# Patient Record
Sex: FEMALE | Race: WHITE | NOT HISPANIC OR LATINO | Employment: OTHER | URBAN - METROPOLITAN AREA
[De-identification: names, ages, dates, MRNs, and addresses within clinical notes are randomized per-mention and may not be internally consistent; named-entity substitution may affect disease eponyms.]

---

## 2022-05-27 NOTE — PROGRESS NOTES
Chief Complaint        Abdominal spasms    History of Present Illness      Sandy Boothe is a 62 y.o. female who presents to [facility] as a new patient with a history of abdominal spasms and personal history of colon polyps.  Patient reports March 2020 that she began feeling a lower abdominal spasm-like sensation that felt like her bowel was twisting.  She reports the pain is intermittent lasting anywhere from 1 to 4 hours when the pain arrives.  She reports bowel movements are normal occurring daily without melena or hematochezia.  She reports being placed on Protonix by her primary care provider which provided no relief.  She denies reflux or heartburn symptoms or any upper GI symptoms.  She denies any family history of colorectal cancer.  Patient reports her last colonoscopy was over 11 years ago and they did remove a couple of polyps.  Patient denies fever, nausea, vomiting, weight loss, night sweats, melena, hematochezia, hematemesis.    No colonoscopy or EGD on file for patient.     Results       Result Review :   The following data was reviewed by: DONITA Ortiz on 05/31/2022                        Past Medical History       History reviewed. No pertinent past medical history.    History reviewed. No pertinent surgical history.      Current Outpatient Medications:   •  methIMAzole (TAPAZOLE) 5 MG tablet, Take 5 mg by mouth 3 (Three) Times a Day., Disp: , Rfl:   •  pantoprazole (PROTONIX) 40 MG EC tablet, Take 40 mg by mouth As Needed., Disp: , Rfl:   •  dicyclomine (BENTYL) 20 MG tablet, Take 1 tablet by mouth Every 6 (Six) Hours., Disp: 90 tablet, Rfl: 3  •  Sodium Sulfate-Mag Sulfate-KCl (Sutab) 6723-549-642 MG tablet, Take 12 tablets by mouth Take As Directed., Disp: 24 tablet, Rfl: 0     No Known Allergies    History reviewed. No pertinent family history.     Social History     Social History Narrative   • Not on file       Objective       Objective     Vital  "Signs:   /74   Pulse 72   Ht 160 cm (63\")   Wt 75.6 kg (166 lb 9.6 oz)   SpO2 100%   BMI 29.51 kg/m²     Body mass index is 29.51 kg/m².    Physical Exam  Constitutional:       General: She is not in acute distress.     Appearance: Normal appearance. She is well-developed and normal weight.   Eyes:      Conjunctiva/sclera: Conjunctivae normal.      Pupils: Pupils are equal, round, and reactive to light.      Visual Fields: Right eye visual fields normal and left eye visual fields normal.   Cardiovascular:      Rate and Rhythm: Normal rate and regular rhythm.      Heart sounds: Normal heart sounds.   Pulmonary:      Effort: Pulmonary effort is normal. No retractions.      Breath sounds: Normal breath sounds and air entry.      Comments: Inspection of chest: normal appearance  Abdominal:      General: Bowel sounds are normal.      Palpations: Abdomen is soft.      Tenderness: There is no abdominal tenderness.      Comments: No appreciable hepatosplenomegaly   Musculoskeletal:      Cervical back: Neck supple.      Right lower leg: No edema.      Left lower leg: No edema.   Lymphadenopathy:      Cervical: No cervical adenopathy.   Skin:     Findings: No lesion.      Comments: Turgor normal   Neurological:      Mental Status: She is alert and oriented to person, place, and time.   Psychiatric:         Mood and Affect: Mood and affect normal.              Assessment & Plan          Assessment and Plan    Diagnoses and all orders for this visit:    1. Personal history of colonic polyps (Primary)    2. Abdominal spasms    3. Lower abdominal pain    Other orders  -     Sodium Sulfate-Mag Sulfate-KCl (Sutab) 3060-829-869 MG tablet; Take 12 tablets by mouth Take As Directed.  Dispense: 24 tablet; Refill: 0  -     dicyclomine (BENTYL) 20 MG tablet; Take 1 tablet by mouth Every 6 (Six) Hours.  Dispense: 90 tablet; Refill: 3      62-year-old female presenting the office today with a history of colon polyps and " abdominal spasms.  I have recommended that the patient undergo further evaluation with a colonoscopy.  I have discussed this procedure in detail with the patient.  I have discussed the risks, benefits and alternatives.  I have discussed the risk of anesthesia, bleeding and perforation.  Patient understands these risks, benefits and alternatives and wishes to proceed.  I will schedule her at her earliest convenience.  I have given the patient Bentyl 20 mg to use as needed for abdominal spasms.  Patient will follow-up in the office after endoscopy.  Patient agreeable to this plan will call with any questions or concerns.            Follow Up       Follow Up   Return for Follow up after endoscopy in office.  Patient was given instructions and counseling regarding her condition or for health maintenance advice. Please see specific information pulled into the AVS if appropriate.

## 2022-05-31 ENCOUNTER — OFFICE VISIT (OUTPATIENT)
Dept: GASTROENTEROLOGY | Facility: CLINIC | Age: 62
End: 2022-05-31

## 2022-05-31 ENCOUNTER — PREP FOR SURGERY (OUTPATIENT)
Dept: OTHER | Facility: HOSPITAL | Age: 62
End: 2022-05-31

## 2022-05-31 VITALS
HEIGHT: 63 IN | WEIGHT: 166.6 LBS | BODY MASS INDEX: 29.52 KG/M2 | DIASTOLIC BLOOD PRESSURE: 74 MMHG | HEART RATE: 72 BPM | SYSTOLIC BLOOD PRESSURE: 149 MMHG | OXYGEN SATURATION: 100 %

## 2022-05-31 DIAGNOSIS — R10.9 ABDOMINAL SPASMS: ICD-10-CM

## 2022-05-31 DIAGNOSIS — R10.30 LOWER ABDOMINAL PAIN: ICD-10-CM

## 2022-05-31 DIAGNOSIS — Z86.010 PERSONAL HISTORY OF COLONIC POLYPS: Primary | ICD-10-CM

## 2022-05-31 PROBLEM — R68.89 HEAT INTOLERANCE: Status: ACTIVE | Noted: 2021-12-13

## 2022-05-31 PROBLEM — Z86.0100 PERSONAL HISTORY OF COLONIC POLYPS: Status: ACTIVE | Noted: 2022-05-31

## 2022-05-31 PROBLEM — E05.90 HYPERTHYROIDISM: Status: ACTIVE | Noted: 2021-12-13

## 2022-05-31 PROBLEM — R63.5 ABNORMAL WEIGHT GAIN: Status: ACTIVE | Noted: 2021-12-13

## 2022-05-31 PROCEDURE — 99204 OFFICE O/P NEW MOD 45 MIN: CPT | Performed by: NURSE PRACTITIONER

## 2022-05-31 RX ORDER — DICYCLOMINE HCL 20 MG
20 TABLET ORAL EVERY 6 HOURS
Qty: 90 TABLET | Refills: 3 | Status: ON HOLD | OUTPATIENT
Start: 2022-05-31 | End: 2022-08-24

## 2022-05-31 RX ORDER — PANTOPRAZOLE SODIUM 40 MG/1
40 TABLET, DELAYED RELEASE ORAL AS NEEDED
Status: ON HOLD | COMMUNITY
End: 2022-08-24

## 2022-05-31 RX ORDER — SOD SULF/POT CHLORIDE/MAG SULF 1.479 G
12 TABLET ORAL TAKE AS DIRECTED
Qty: 24 TABLET | Refills: 0 | Status: ON HOLD | OUTPATIENT
Start: 2022-05-31 | End: 2022-08-24

## 2022-05-31 RX ORDER — METHIMAZOLE 5 MG/1
5 TABLET ORAL 3 TIMES DAILY
COMMUNITY
Start: 2022-03-15

## 2022-06-01 ENCOUNTER — TRANSCRIBE ORDERS (OUTPATIENT)
Dept: ADMINISTRATIVE | Facility: HOSPITAL | Age: 62
End: 2022-06-01

## 2022-06-01 DIAGNOSIS — Z12.31 VISIT FOR SCREENING MAMMOGRAM: Primary | ICD-10-CM

## 2022-06-08 ENCOUNTER — PATIENT ROUNDING (BHMG ONLY) (OUTPATIENT)
Dept: GASTROENTEROLOGY | Facility: CLINIC | Age: 62
End: 2022-06-08

## 2022-06-08 NOTE — PROGRESS NOTES
June 8, 2022    Hello, may I speak with Sandy Boothe?    My name is Juan Carlos      I am  with [facility]  [address] [place] [address] [phone].    Before we get started may I verify your date of birth? 1960    I am calling to officially welcome you to our practice and ask about your recent visit. Is this a good time to talk? No. Left pt VM.     Tell me about your visit with us. What things went well?         We're always looking for ways to make our patients' experiences even better. Do you have recommendations on ways we may improve?      Overall were you satisfied with your first visit to our practice?        I appreciate you taking the time to speak with me today. Is there anything else I can do for you?       Thank you, and have a great day.

## 2022-07-11 ENCOUNTER — HOSPITAL ENCOUNTER (OUTPATIENT)
Dept: MAMMOGRAPHY | Facility: HOSPITAL | Age: 62
Discharge: HOME | End: 2022-07-11
Admitting: INTERNAL MEDICINE

## 2022-07-11 DIAGNOSIS — Z12.31 VISIT FOR SCREENING MAMMOGRAM: ICD-10-CM

## 2022-07-11 PROCEDURE — 77067 SCR MAMMO BI INCL CAD: CPT

## 2022-07-11 PROCEDURE — 77063 BREAST TOMOSYNTHESIS BI: CPT

## 2022-08-23 NOTE — PAT
Attempted to contact patient regarding PAT.  No answer.  Left message with arrival time of 0830 on 8/24/22 for a colonoscopy with Dr. Rose.  Instructed to come to the [facility] entrance to check in. Reinforced NPO.    Netta Segal RN 10:51 EDT 8/23/2022

## 2022-08-24 ENCOUNTER — ANESTHESIA EVENT (OUTPATIENT)
Dept: GASTROENTEROLOGY | Facility: HOSPITAL | Age: 62
End: 2022-08-24

## 2022-08-24 ENCOUNTER — ANESTHESIA (OUTPATIENT)
Dept: GASTROENTEROLOGY | Facility: HOSPITAL | Age: 62
End: 2022-08-24

## 2022-08-24 ENCOUNTER — HOSPITAL ENCOUNTER (OUTPATIENT)
Facility: HOSPITAL | Age: 62
Setting detail: HOSPITAL OUTPATIENT SURGERY
Discharge: HOME | End: 2022-08-24
Attending: INTERNAL MEDICINE | Admitting: INTERNAL MEDICINE

## 2022-08-24 VITALS
SYSTOLIC BLOOD PRESSURE: 120 MMHG | OXYGEN SATURATION: 97 % | RESPIRATION RATE: 21 BRPM | TEMPERATURE: 96.9 F | DIASTOLIC BLOOD PRESSURE: 76 MMHG | BODY MASS INDEX: 29.06 KG/M2 | HEART RATE: 78 BPM | WEIGHT: 164.02 LBS

## 2022-08-24 DIAGNOSIS — R10.9 ABDOMINAL SPASMS: ICD-10-CM

## 2022-08-24 DIAGNOSIS — R10.30 LOWER ABDOMINAL PAIN: ICD-10-CM

## 2022-08-24 DIAGNOSIS — Z86.010 PERSONAL HISTORY OF COLONIC POLYPS: ICD-10-CM

## 2022-08-24 PROCEDURE — 88305 TISSUE EXAM BY PATHOLOGIST: CPT | Performed by: INTERNAL MEDICINE

## 2022-08-24 PROCEDURE — 45385 COLONOSCOPY W/LESION REMOVAL: CPT | Performed by: INTERNAL MEDICINE

## 2022-08-24 PROCEDURE — 25010000002 PROPOFOL 10 MG/ML EMULSION: Performed by: NURSE ANESTHETIST, CERTIFIED REGISTERED

## 2022-08-24 RX ORDER — PROPOFOL 10 MG/ML
VIAL (ML) INTRAVENOUS AS NEEDED
Status: DISCONTINUED | OUTPATIENT
Start: 2022-08-24 | End: 2022-08-24 | Stop reason: SURG

## 2022-08-24 RX ORDER — SODIUM CHLORIDE, SODIUM LACTATE, POTASSIUM CHLORIDE, CALCIUM CHLORIDE 600; 310; 30; 20 MG/100ML; MG/100ML; MG/100ML; MG/100ML
30 INJECTION, SOLUTION INTRAVENOUS CONTINUOUS
Status: DISCONTINUED | OUTPATIENT
Start: 2022-08-24 | End: 2022-08-24 | Stop reason: HOSPADM

## 2022-08-24 RX ORDER — LIDOCAINE HYDROCHLORIDE 20 MG/ML
INJECTION, SOLUTION EPIDURAL; INFILTRATION; INTRACAUDAL; PERINEURAL AS NEEDED
Status: DISCONTINUED | OUTPATIENT
Start: 2022-08-24 | End: 2022-08-24 | Stop reason: SURG

## 2022-08-24 RX ADMIN — LIDOCAINE HYDROCHLORIDE 80 MG: 20 INJECTION, SOLUTION EPIDURAL; INFILTRATION; INTRACAUDAL; PERINEURAL at 10:35

## 2022-08-24 RX ADMIN — SODIUM CHLORIDE, POTASSIUM CHLORIDE, SODIUM LACTATE AND CALCIUM CHLORIDE 30 ML/HR: 600; 310; 30; 20 INJECTION, SOLUTION INTRAVENOUS at 09:33

## 2022-08-24 RX ADMIN — PROPOFOL 100 MG: 10 INJECTION, EMULSION INTRAVENOUS at 10:35

## 2022-08-24 RX ADMIN — PROPOFOL 225 MCG/KG/MIN: 10 INJECTION, EMULSION INTRAVENOUS at 10:36

## 2022-08-24 NOTE — H&P
ScreeningPre Procedure History & Physical    Chief Complaint:   Screening     Subjective     HPI:   Screening     Past Medical History:   Past Medical History:   Diagnosis Date   • Thyroid disease        Past Surgical History:  Past Surgical History:   Procedure Laterality Date   • COLONOSCOPY         Family History:  History reviewed. No pertinent family history.    Social History:   reports that she has never smoked. She has never used smokeless tobacco. She reports previous alcohol use of about 2.0 standard drinks of alcohol per week. She reports that she does not use drugs.    Medications:   Medications Prior to Admission   Medication Sig Dispense Refill Last Dose   • methIMAzole (TAPAZOLE) 5 MG tablet Take 5 mg by mouth 3 (Three) Times a Day.   8/23/2022 at Unknown time       Allergies:  Patient has no known allergies.        Objective     Blood pressure 121/79, pulse 75, temperature 97.1 °F (36.2 °C), temperature source Temporal, resp. rate 16, weight 74.4 kg (164 lb 0.4 oz), SpO2 96 %.    Physical Exam   Constitutional: Pt is oriented to person, place, and time and well-developed, well-nourished, and in no distress.   Mouth/Throat: Oropharynx is clear and moist.   Neck: Normal range of motion.   Cardiovascular: Normal rate, regular rhythm and normal heart sounds.    Pulmonary/Chest: Effort normal and breath sounds normal.   Abdominal: Soft. Nontender  Skin: Skin is warm and dry.   Psychiatric: Mood, memory, affect and judgment normal.     Assessment & Plan     Diagnosis:  Screening colonoscopy  H/o colon polyps     Anticipated Surgical Procedure:  colonoscopy    The risks, benefits, and alternatives of this procedure have been discussed with the patient or the responsible party- the patient understands and agrees to proceed.

## 2022-08-24 NOTE — ANESTHESIA PREPROCEDURE EVALUATION
Anesthesia Evaluation     Patient summary reviewed and Nursing notes reviewed                Airway   Mallampati: I  TM distance: >3 FB  Neck ROM: full  No difficulty expected  Dental      Pulmonary - negative pulmonary ROS and normal exam    breath sounds clear to auscultation  Cardiovascular - negative cardio ROS and normal exam    Rhythm: regular  Rate: normal        Neuro/Psych- negative ROS  GI/Hepatic/Renal/Endo    (+)   thyroid problem     Musculoskeletal (-) negative ROS    Abdominal    Substance History - negative use     OB/GYN negative ob/gyn ROS         Other                        Anesthesia Plan    ASA 1     general     intravenous induction     Anesthetic plan, risks, benefits, and alternatives have been provided, discussed and informed consent has been obtained with: patient.        CODE STATUS:

## 2022-08-24 NOTE — ANESTHESIA POSTPROCEDURE EVALUATION
Patient: Sandy Boothe    Procedure Summary     Date: 08/24/22 Room / Location: [facility] ENDOSCOPY 2 / [facility]    Anesthesia Start: 1033 Anesthesia Stop: 1108    Procedure: COLONOSCOPY WITH POLYPECTOMY (N/A ) Diagnosis:       Personal history of colonic polyps      Abdominal spasms      Lower abdominal pain      (Personal history of colonic polyps [Z86.010])      (Abdominal spasms [R10.9])      (Lower abdominal pain [R10.30])    Surgeons: Hosea Rose MD Provider: Carl Mart MD    Anesthesia Type: general ASA Status: 1          Anesthesia Type: general    Vitals  Vitals Value Taken Time   /81 08/24/22 1116   Temp 36.1 °C (97 °F) 08/24/22 1105   Pulse 73 08/24/22 1118   Resp 20 08/24/22 1115   SpO2 97 % 08/24/22 1118   Vitals shown include unvalidated device data.        Post Anesthesia Care and Evaluation    Patient location during evaluation: bedside  Patient participation: complete - patient participated  Level of consciousness: awake  Pain management: adequate    Airway patency: patent  Anesthetic complications: No anesthetic complications  PONV Status: none  Cardiovascular status: acceptable  Respiratory status: acceptable  Hydration status: acceptable    Comments: An Anesthesiologist personally participated in the most demanding procedures (including induction and emergence if applicable) in the anesthesia plan, monitored the course of anesthesia administration at frequent intervals and remained physically present and available for immediate diagnosis and treatment of emergencies.

## 2022-08-25 LAB
CYTO UR: NORMAL
LAB AP CASE REPORT: NORMAL
LAB AP CLINICAL INFORMATION: NORMAL
PATH REPORT.FINAL DX SPEC: NORMAL
PATH REPORT.GROSS SPEC: NORMAL

## 2023-01-18 ENCOUNTER — OFFICE VISIT (OUTPATIENT)
Dept: INTERNAL MEDICINE | Facility: CLINIC | Age: 63
End: 2023-01-18
Payer: COMMERCIAL

## 2023-01-18 VITALS
WEIGHT: 172.8 LBS | DIASTOLIC BLOOD PRESSURE: 88 MMHG | OXYGEN SATURATION: 99 % | BODY MASS INDEX: 30.62 KG/M2 | HEART RATE: 83 BPM | RESPIRATION RATE: 18 BRPM | SYSTOLIC BLOOD PRESSURE: 126 MMHG | HEIGHT: 63 IN | TEMPERATURE: 97.9 F

## 2023-01-18 DIAGNOSIS — Z71.85 VACCINE COUNSELING: ICD-10-CM

## 2023-01-18 DIAGNOSIS — D36.9 TUBULAR ADENOMA: ICD-10-CM

## 2023-01-18 DIAGNOSIS — Z00.00 ANNUAL PHYSICAL EXAM: ICD-10-CM

## 2023-01-18 DIAGNOSIS — Z13.31 NEGATIVE DEPRESSION SCREENING: ICD-10-CM

## 2023-01-18 DIAGNOSIS — Z13.820 SCREENING FOR OSTEOPOROSIS: ICD-10-CM

## 2023-01-18 DIAGNOSIS — Z11.59 ENCOUNTER FOR HEPATITIS C SCREENING TEST FOR LOW RISK PATIENT: ICD-10-CM

## 2023-01-18 DIAGNOSIS — R23.2 HOT FLASHES: ICD-10-CM

## 2023-01-18 DIAGNOSIS — Z00.00 ENCOUNTER FOR MEDICAL EXAMINATION TO ESTABLISH CARE: Primary | ICD-10-CM

## 2023-01-18 DIAGNOSIS — E05.90 HYPERTHYROIDISM: ICD-10-CM

## 2023-01-18 DIAGNOSIS — Z78.0 POST-MENOPAUSAL: ICD-10-CM

## 2023-01-18 PROBLEM — K30 FUNCTIONAL DYSPEPSIA: Status: ACTIVE | Noted: 2023-01-18

## 2023-01-18 PROBLEM — E66.3 OVERWEIGHT WITH BODY MASS INDEX (BMI) 25.0-29.9: Status: ACTIVE | Noted: 2023-01-18

## 2023-01-18 PROCEDURE — 99386 PREV VISIT NEW AGE 40-64: CPT | Performed by: STUDENT IN AN ORGANIZED HEALTH CARE EDUCATION/TRAINING PROGRAM

## 2023-01-18 NOTE — PROGRESS NOTES
"Chief Complaint  Establish Care    Subjective            Sandy Boothe presents to [facility]  History of Present Illness     Previous PCP: Dr. Mcdonald   Last seen by them: last seen in .   Last pap: unsure, of last pap exam.   Last mammogram:  2022 and wnl. Older sister (prosper left mastectomy, in her 50's) and maternal grandmother w/ breast cancer. Pt states she has a hx of abnormal mammogram due to dense breast.   Last dexa scan: \"it has been a while\"   Colonoscopy: last was in 2022, 5 yr recall  Smoking hx: socially in her teenage years.     She moved to [place] in 2021 from [place].     Hyperthyroidism:  Chronic as of past 3-4 years.   States her last thyroid imaging studies were done in 2019 at time of diagnosis. She is unsure if she had any thyroid nodules.    States it was diagnosed when she started having worsening hot flashes.   No one else in the family w/ thyroid disease.     Folllowing w/ Dr. Massey, endocrinology.       Hx of smaller right kidney:   Right kidney small than left noted on US of her abdomen.     Tubular adenoma:   Noted on recent colonoscopy, 4mm sessile polyp from ascending polyp.   5 yr recall is recommended.   No fhx of colon cancer.     Gyn/ob hx:   LMP was around 2000, in her 40's.   , vaginal, 6 wk premature.     States Corry Montgomery, her roommate,  is her support system.   States low mood due to current conflict w/ her roommate.   She reads, quilt and does puzzle in the colder months.   States she spends a lot of time of outside.     Vaccine counseling:   Unsure of timing of last tetanus vaccine     Past Medical History:   Diagnosis Date   • Thyroid disease        Allergies:   No Known Allergies       Past Surgical History:   Procedure Laterality Date   • COLONOSCOPY     • COLONOSCOPY N/A 2022    Procedure: COLONOSCOPY WITH POLYPECTOMY;  Surgeon: Hosea Rose MD;  Location: [facility] ENDOSCOPY;  " "Service: Gastroenterology;  Laterality: N/A;  COLON POLYP, DIVERTICULOSIS          Social History     Socioeconomic History   • Marital status:    Tobacco Use   • Smoking status: Never   • Smokeless tobacco: Never   Vaping Use   • Vaping Use: Never used   Substance and Sexual Activity   • Alcohol use: Not Currently     Alcohol/week: 2.0 standard drinks     Types: 2 Shots of liquor per week     Comment: Social   • Drug use: Never   • Sexual activity: Defer         Family History   Problem Relation Age of Onset   • Heart attack Father    • Pancreatic cancer Father     Mother w/ Parkison' disease.    Sister dc in her 60's due to complications from neck surgery (paralyzed from neck down, did not survived long after that).       Health Maintenance Due   Topic Date Due   • TDAP/TD VACCINES (1 - Tdap) Never done   • HEPATITIS C SCREENING  Never done   • ANNUAL PHYSICAL  Never done   • PAP SMEAR  Never done            Current Outpatient Medications:   •  methIMAzole (TAPAZOLE) 5 MG tablet, Take 5 mg by mouth 3 (Three) Times a Day., Disp: , Rfl:       Immunization History   Administered Date(s) Administered   • COVID-19 (PFIZER) BIVALENT BOOSTER 12+YRS 10/14/2022   • COVID-19 (PFIZER) PURPLE CAP 04/08/2021, 04/29/2021, 11/24/2021, 10/14/2022   • Fluzone Quad >6mos (Multi-dose) 10/27/2021   • Influenza Quad Vaccine (Inpatient) 10/14/2022   • Influenza, Unspecified 10/14/2022   • Shingrix 10/27/2021         Review of Systems   Per hpi     Objective       Vitals:    01/18/23 1457   BP: 126/88   BP Location: Left arm   Patient Position: Sitting   Cuff Size: Adult   Pulse: 83   Resp: 18   Temp: 97.9 °F (36.6 °C)   SpO2: 99%   Weight: 78.4 kg (172 lb 12.8 oz)   Height: 160 cm (62.99\")     Body mass index is 30.62 kg/m².      Physical Exam  Vitals reviewed.   Constitutional:       Appearance: Normal appearance.   HENT:      Head: Normocephalic and atraumatic.      Nose: Nose normal.   Eyes:      Extraocular Movements: " Extraocular movements intact.      Conjunctiva/sclera: Conjunctivae normal.   Cardiovascular:      Rate and Rhythm: Normal rate and regular rhythm.      Pulses: Normal pulses.      Heart sounds: Normal heart sounds.   Pulmonary:      Effort: Pulmonary effort is normal. No respiratory distress.      Breath sounds: Normal breath sounds.   Abdominal:      General: Bowel sounds are normal. There is no distension.      Palpations: Abdomen is soft.      Tenderness: There is no abdominal tenderness.   Musculoskeletal:         General: Normal range of motion.   Skin:     General: Skin is warm and dry.   Neurological:      General: No focal deficit present.      Mental Status: She is alert and oriented to person, place, and time.      Cranial Nerves: No cranial nerve deficit.   Psychiatric:         Mood and Affect: Mood normal.         Behavior: Behavior normal.         Thought Content: Thought content normal.             Result Review :                           Assessment and Plan      Diagnoses and all orders for this visit:    1. Encounter for medical examination to establish care (Primary)  Comments:  Acute and chronic needs addressed below.   Orders:  -     CBC & Differential  -     Comprehensive Metabolic Panel  -     Hemoglobin A1c  -     Lipid Panel  -     TSH  -     T4, Free  -     Vitamin D 25 hydroxy    2. Hyperthyroidism  Comments:  Chronic, due for labs. Following w/ endocrinology, Dr. Massey.     3. Hot flashes  Comments:  Chronic, stable.     4. Encounter for hepatitis C screening test for low risk patient  Comments:  Due for screening per current guidelines.  Orders:  -     Hepatitis C Antibody    5. Vaccine counseling  Comments:  Pt will work on finding date of last tetanus vaccine. She is otherwise utd.     6. Post-menopausal  Comments:  Due for screening for osteoporosis.   Orders:  -     DEXA Bone Density Axial    7. Screening for osteoporosis  Comments:  Due for screening for osteoporosis.   Orders:  -      DEXA Bone Density Axial    8. Annual physical exam  Comments:  Acute and chronic needs addressed.     9. Negative depression screening  Comments:  repeat screen prn     10. Tubular adenoma  Comments:  noted on colonoscopy from 08/2022 w/ 4mm polyp w/ 5 yr recall.       Preventive counseling about the importance of daily exercise, healthy eating and good sleep hygiene for cardiovascular and mental health discussed.         Follow Up     Return in about 4 months (around 5/18/2023) for pap exam .    Patient was given instructions and counseling regarding her condition or for health maintenance advice. Please see specific information pulled into the AVS if appropriate.     Candelaria Owens MD   Internal Medicine-Pediatrics

## 2023-01-22 PROBLEM — D36.9 TUBULAR ADENOMA: Status: ACTIVE | Noted: 2023-01-22

## 2023-01-24 ENCOUNTER — PATIENT ROUNDING (BHMG ONLY) (OUTPATIENT)
Dept: INTERNAL MEDICINE | Facility: CLINIC | Age: 63
End: 2023-01-24
Payer: COMMERCIAL

## 2023-02-02 NOTE — PROGRESS NOTES
A My-Chart message has been sent to the patient for PATIENT [facility] with [facility]

## 2023-02-06 ENCOUNTER — HOSPITAL ENCOUNTER (OUTPATIENT)
Dept: BONE DENSITY | Facility: HOSPITAL | Age: 63
Discharge: HOME | End: 2023-02-06
Admitting: STUDENT IN AN ORGANIZED HEALTH CARE EDUCATION/TRAINING PROGRAM
Payer: COMMERCIAL

## 2023-02-06 PROCEDURE — 77080 DXA BONE DENSITY AXIAL: CPT

## 2023-04-27 ENCOUNTER — OFFICE VISIT (OUTPATIENT)
Dept: INTERNAL MEDICINE | Facility: CLINIC | Age: 63
End: 2023-04-27
Payer: COMMERCIAL

## 2023-04-27 VITALS
HEIGHT: 63 IN | BODY MASS INDEX: 30.33 KG/M2 | TEMPERATURE: 98.1 F | RESPIRATION RATE: 18 BRPM | DIASTOLIC BLOOD PRESSURE: 92 MMHG | WEIGHT: 171.2 LBS | HEART RATE: 78 BPM | OXYGEN SATURATION: 100 % | SYSTOLIC BLOOD PRESSURE: 136 MMHG

## 2023-04-27 DIAGNOSIS — R09.89 GLOBUS SENSATION: Primary | ICD-10-CM

## 2023-04-27 DIAGNOSIS — M79.671 HEEL PAIN, CHRONIC, RIGHT: ICD-10-CM

## 2023-04-27 DIAGNOSIS — G89.29 HEEL PAIN, CHRONIC, RIGHT: ICD-10-CM

## 2023-04-27 DIAGNOSIS — R09.82 PND (POST-NASAL DRIP): ICD-10-CM

## 2023-04-27 PROCEDURE — 99214 OFFICE O/P EST MOD 30 MIN: CPT | Performed by: STUDENT IN AN ORGANIZED HEALTH CARE EDUCATION/TRAINING PROGRAM

## 2023-04-27 RX ORDER — FLUTICASONE PROPIONATE 50 MCG
2 SPRAY, SUSPENSION (ML) NASAL DAILY
Qty: 16 G | Refills: 1 | Status: SHIPPED | OUTPATIENT
Start: 2023-04-27

## 2023-04-27 RX ORDER — OMEPRAZOLE 20 MG/1
20 CAPSULE, DELAYED RELEASE ORAL DAILY
COMMUNITY
End: 2023-04-27 | Stop reason: SDUPTHER

## 2023-04-27 RX ORDER — CETIRIZINE HYDROCHLORIDE 10 MG/1
10 TABLET ORAL DAILY
Qty: 30 TABLET | Refills: 1 | Status: SHIPPED | OUTPATIENT
Start: 2023-04-27 | End: 2023-05-27

## 2023-04-27 RX ORDER — OMEPRAZOLE 20 MG/1
20 CAPSULE, DELAYED RELEASE ORAL DAILY
Qty: 30 CAPSULE | Refills: 1 | Status: SHIPPED | OUTPATIENT
Start: 2023-04-27 | End: 2023-05-27

## 2023-04-27 NOTE — PROGRESS NOTES
Chief Complaint  Heartburn (Is on omeprazole. Sometimes does have the lump in throat when laying down./), drainage (Constantly in the back of throat and coughing, been this way for years. Causes her to clear her throat all the time), labs (Chloride level was high and was wondering about that.), and Foot Injury (When she doesn't wear shoes feels like she has a stone bruise but doesn't hurt when touching.)    Subjective            Sandy Boothe presents to [facility]  History of Present Illness    GERD:  Chronic since 2021.   Endorses sensation of lump in her throat sometimes when she lays on her right side in bed, this improves if she lays on her back. Endorses burning sensation in her throat last week, which occurred just once.   Last meal is about 7pm, bedtime is 9:30pm.   Has Prilosec from previous provider which uses 1-2x per week.   Has never had EGD.     Posterior nasal drainage:  Chronic for many years. Has cough as well which started this winter, and feels that she always has to clear her throat.  Has not tried any otc agents.   Seen by ENT in past w/o any known dx.   Has hx of allergies.     Right heel concern:  Feels like a bruise over lateral aspect of her heel, rated 2/10. Going on for past 1 year.   States symptoms more prominent when she is barefoot, and tends to limp when she   No injury.   Area is not tender to the touch.         Past Medical History:   Diagnosis Date   • Thyroid disease        Allergies:   No Known Allergies       Past Surgical History:   Procedure Laterality Date   • COLONOSCOPY     • COLONOSCOPY N/A 8/24/2022    Procedure: COLONOSCOPY WITH POLYPECTOMY;  Surgeon: Hosea Rose MD;  Location: [facility] ENDOSCOPY;  Service: Gastroenterology;  Laterality: N/A;  COLON POLYP, DIVERTICULOSIS          Social History     Socioeconomic History   • Marital status:    Tobacco Use   • Smoking status: Never   • Smokeless  "tobacco: Never   Vaping Use   • Vaping Use: Never used   Substance and Sexual Activity   • Alcohol use: Not Currently     Alcohol/week: 2.0 standard drinks     Types: 2 Shots of liquor per week     Comment: Social   • Drug use: Never   • Sexual activity: Defer         Family History   Problem Relation Age of Onset   • Heart attack Father    • Pancreatic cancer Father           Health Maintenance Due   Topic Date Due   • TDAP/TD VACCINES (1 - Tdap) Never done   • HEPATITIS C SCREENING  Never done   • PAP SMEAR  Never done   • COVID-19 Vaccine (5 - Booster for Pfizer series) 12/09/2022            Current Outpatient Medications:   •  methIMAzole (TAPAZOLE) 5 MG tablet, Take 1 tablet by mouth 3 (Three) Times a Day., Disp: , Rfl:   •  omeprazole (priLOSEC) 20 MG capsule, Take 1 capsule by mouth Daily for 30 days. As needed, Disp: 30 capsule, Rfl: 1  •  cetirizine (zyrTEC) 10 MG tablet, Take 1 tablet by mouth Daily for 30 days., Disp: 30 tablet, Rfl: 1  •  fluticasone (FLONASE) 50 MCG/ACT nasal spray, 2 sprays into the nostril(s) as directed by provider Daily., Disp: 16 g, Rfl: 1      Immunization History   Administered Date(s) Administered   • COVID-19 (PFIZER) BIVALENT 12+YRS 10/14/2022   • COVID-19 (PFIZER) Purple Cap Monovalent 04/08/2021, 04/29/2021, 11/24/2021, 10/14/2022   • Fluzone Quad >6mos (Multi-dose) 10/27/2021   • Influenza Injectable Mdck Pf Quad 10/14/2022   • Influenza Quad Vaccine (Inpatient) 10/14/2022   • Influenza, Unspecified 10/14/2022   • Shingrix 10/27/2021         Review of Systems       Objective       Vitals:    04/27/23 0911   BP: 136/92   BP Location: Left arm   Patient Position: Sitting   Cuff Size: Adult   Pulse: 78   Resp: 18   Temp: 98.1 °F (36.7 °C)   SpO2: 100%   Weight: 77.7 kg (171 lb 3.2 oz)   Height: 160 cm (62.99\")     Body mass index is 30.33 kg/m².      Physical Exam  Vitals reviewed.   Constitutional:       Appearance: Normal appearance.   HENT:      Head: Normocephalic and " atraumatic.      Nose: Nose normal.   Eyes:      Extraocular Movements: Extraocular movements intact.      Conjunctiva/sclera: Conjunctivae normal.   Cardiovascular:      Rate and Rhythm: Normal rate and regular rhythm.      Pulses: Normal pulses.      Heart sounds: Normal heart sounds.   Pulmonary:      Effort: Pulmonary effort is normal. No respiratory distress.      Breath sounds: Normal breath sounds.   Musculoskeletal:         General: No swelling, tenderness, deformity or signs of injury. Normal range of motion.      Right lower leg: No edema.      Left lower leg: No edema.   Skin:     General: Skin is warm and dry.   Neurological:      General: No focal deficit present.      Mental Status: She is alert and oriented to person, place, and time.      Cranial Nerves: No cranial nerve deficit.   Psychiatric:         Mood and Affect: Mood normal.         Behavior: Behavior normal.         Thought Content: Thought content normal.             Result Review :                           Assessment and Plan      Diagnoses and all orders for this visit:    1. Globus sensation (Primary)  Comments:  Chronic, in pt w/ chronic GERD. On prilosec however using sparingly. Recommend pt starts taking prilosec daily instead of prn for next 6-12wks w/ plan to refer to GI if no improvement at f/u.   Orders:  -     omeprazole (priLOSEC) 20 MG capsule; Take 1 capsule by mouth Daily for 30 days. As needed  Dispense: 30 capsule; Refill: 1    2. PND (post-nasal drip)  Comments:  Chronic, however has never been on rx for allergies. New prescriptions sent  in.   Orders:  -     cetirizine (zyrTEC) 10 MG tablet; Take 1 tablet by mouth Daily for 30 days.  Dispense: 30 tablet; Refill: 1  -     fluticasone (FLONASE) 50 MCG/ACT nasal spray; 2 sprays into the nostril(s) as directed by provider Daily.  Dispense: 16 g; Refill: 1    3. Heel pain, chronic, right  Comments:  Chronic with unremarkable exam. Recommend supportive care w/ stretching  exercises. Discussed referral to podiatry if symptoms worsen.           Follow Up     Return in about 6 weeks (around 6/8/2023) for PND, cough, globus sensation .    Patient was given instructions and counseling regarding her condition or for health maintenance advice. Please see specific information pulled into the AVS if appropriate.     Candelaria Owens MD   Internal Medicine-Pediatrics

## 2023-06-14 ENCOUNTER — TELEPHONE (OUTPATIENT)
Dept: INTERNAL MEDICINE | Facility: CLINIC | Age: 63
End: 2023-06-14

## 2023-06-14 ENCOUNTER — OFFICE VISIT (OUTPATIENT)
Dept: INTERNAL MEDICINE | Facility: CLINIC | Age: 63
End: 2023-06-14
Payer: COMMERCIAL

## 2023-06-14 VITALS
WEIGHT: 176.4 LBS | BODY MASS INDEX: 31.25 KG/M2 | DIASTOLIC BLOOD PRESSURE: 70 MMHG | SYSTOLIC BLOOD PRESSURE: 122 MMHG | RESPIRATION RATE: 19 BRPM | HEART RATE: 87 BPM | OXYGEN SATURATION: 97 % | HEIGHT: 63 IN | TEMPERATURE: 97.8 F

## 2023-06-14 DIAGNOSIS — R09.89 GLOBUS SENSATION: Primary | ICD-10-CM

## 2023-06-14 DIAGNOSIS — R61 SWEATING INCREASE: ICD-10-CM

## 2023-06-14 DIAGNOSIS — S40.262A TICK BITE OF LEFT SHOULDER, INITIAL ENCOUNTER: ICD-10-CM

## 2023-06-14 DIAGNOSIS — T78.40XD ALLERGY, SUBSEQUENT ENCOUNTER: ICD-10-CM
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
## 2023-06-14 RX ORDER — OMEPRAZOLE 20 MG/1
20 CAPSULE, DELAYED RELEASE ORAL DAILY
COMMUNITY
End: 2023-06-14 | Stop reason: SDUPTHER

## 2023-06-14 RX ORDER — OMEPRAZOLE 20 MG/1
20 CAPSULE, DELAYED RELEASE ORAL DAILY
Qty: 90 CAPSULE | Refills: 1 | Status: SHIPPED | OUTPATIENT
Start: 2023-06-14 | End: 2023-09-12

## 2023-06-14 RX ORDER — DOXYCYCLINE HYCLATE 200 MG/1
150 TABLET, DELAYED RELEASE ORAL ONCE
Qty: 1 TABLET | Refills: 0 | Status: SHIPPED | OUTPATIENT
Start: 2023-06-14 | End: 2023-06-14

## 2023-06-14 RX ORDER — MONTELUKAST SODIUM 10 MG/1
10 TABLET ORAL NIGHTLY
Qty: 30 TABLET | Refills: 2 | Status: SHIPPED | OUTPATIENT
Start: 2023-06-14 | End: 2023-07-14

## 2023-06-14 NOTE — TELEPHONE ENCOUNTER
Pharmacy Name: [id number] - [place], [place] - [address]      Pharmacy representative name: MARIAJOSE    Pharmacy representative phone number: [phone]     What medication are you calling in regards to: DOXYCYCLINE 200 MG DELAYED RELEASE    What question does the pharmacy have: MARIAJOSE STATED THAT THEY ARE NEEDING CLARIFICATION ON DOSAGE AS IT INSTRUCTS 150 MG PER 1 DOSE    Who is the provider that prescribed the medication: Candelaria Owens MD

## 2023-06-14 NOTE — PROGRESS NOTES
"Chief Complaint  Globus sensation (6 week follow up), Cough, post nasal drip, and Excessive Sweating (Feels like she sweats too much.)    Subjective            Sandy Boothe presents to [facility]  History of Present Illness    Globus sensation:   Did take the prilosec daily as suggested and states she is no longer having any sensation of food getting stuck in her throat. She has chronic reflux, and has never had EGD.     Sweating:  Chronic, for >5 years.   Wonders if it has to do with her weight.   States she sweats even with doing the laundry at home and this is worse when she is outside.   She does have hot flashes and does the \"covers on and covers off\" at night.     Allergies:   Persisting.   Cough, post nasal drip remains the same.   Did not notice any improvement with zyrtec.    Reports recent tick bite x2. One tick she removed a couple days ago was very, engorged, she is unsure how long it was on her body prior to removal. Noted a new tick, thinner over left anterior thigh which she removed yesterday.      Past Medical History:   Diagnosis Date    Thyroid disease        Allergies:   No Known Allergies       Past Surgical History:   Procedure Laterality Date    COLONOSCOPY      COLONOSCOPY N/A 8/24/2022    Procedure: COLONOSCOPY WITH POLYPECTOMY;  Surgeon: Hosea Rose MD;  Location: [facility] ENDOSCOPY;  Service: Gastroenterology;  Laterality: N/A;  COLON POLYP, DIVERTICULOSIS          Social History     Socioeconomic History    Marital status:    Tobacco Use    Smoking status: Never    Smokeless tobacco: Never   Vaping Use    Vaping Use: Never used   Substance and Sexual Activity    Alcohol use: Not Currently     Alcohol/week: 2.0 standard drinks     Types: 2 Shots of liquor per week     Comment: Social    Drug use: Never    Sexual activity: Defer         Family History   Problem Relation Age of Onset    Heart attack Father     Pancreatic " "cancer Father           Health Maintenance Due   Topic Date Due    TDAP/TD VACCINES (1 - Tdap) Never done    HEPATITIS C SCREENING  Never done    PAP SMEAR  Never done    COVID-19 Vaccine (5 - Pfizer series) 12/09/2022            Current Outpatient Medications:     fluticasone (FLONASE) 50 MCG/ACT nasal spray, 2 sprays into the nostril(s) as directed by provider Daily., Disp: 16 g, Rfl: 1    methIMAzole (TAPAZOLE) 5 MG tablet, Take 1 tablet by mouth 3 (Three) Times a Day., Disp: , Rfl:     omeprazole (priLOSEC) 20 MG capsule, Take 1 capsule by mouth Daily for 90 days., Disp: 90 capsule, Rfl: 1    cetirizine (zyrTEC) 10 MG tablet, Take 1 tablet by mouth Daily for 30 days. (Patient not taking: Reported on 6/14/2023), Disp: 30 tablet, Rfl: 1    doxycycline 200 MG tablet delayed-release enteric coated tablet, Take 150 mg by mouth 1 (One) Time for 1 dose., Disp: 1 tablet, Rfl: 0    montelukast (Singulair) 10 MG tablet, Take 1 tablet by mouth Every Night for 30 days., Disp: 30 tablet, Rfl: 2      Immunization History   Administered Date(s) Administered    COVID-19 (PFIZER) BIVALENT 12+YRS 10/14/2022    COVID-19 (PFIZER) Purple Cap Monovalent 04/08/2021, 04/29/2021, 11/24/2021, 10/14/2022    Fluzone Quad >6mos (Multi-dose) 10/27/2021    Influenza Injectable Mdck Pf Quad 10/14/2022    Influenza Quad Vaccine (Inpatient) 10/14/2022    Influenza, Unspecified 10/14/2022    Shingrix 10/27/2021         Review of Systems       Objective       Vitals:    06/14/23 1032   BP: 122/70   BP Location: Left arm   Patient Position: Sitting   Cuff Size: Adult   Pulse: 87   Resp: 19   Temp: 97.8 °F (36.6 °C)   SpO2: 97%   Weight: 80 kg (176 lb 6.4 oz)   Height: 160 cm (62.99\")     Body mass index is 31.26 kg/m².      Physical Exam  Vitals reviewed.   Constitutional:       Appearance: Normal appearance.   HENT:      Head: Normocephalic and atraumatic.      Nose: Nose normal.   Eyes:      Extraocular Movements: Extraocular movements intact.    "   Conjunctiva/sclera: Conjunctivae normal.   Pulmonary:      Effort: Pulmonary effort is normal. No respiratory distress.   Musculoskeletal:         General: Normal range of motion.   Skin:     General: Skin is warm and dry.      Comments: Erythema noted over anterior aspect of left hip from recent tick bite.        Neurological:      General: No focal deficit present.      Mental Status: She is alert and oriented to person, place, and time.      Cranial Nerves: No cranial nerve deficit.   Psychiatric:         Mood and Affect: Mood normal.         Behavior: Behavior normal.         Thought Content: Thought content normal.           Result Review :                           Assessment and Plan      Diagnoses and all orders for this visit:    1. Globus sensation (Primary)  Comments:  Resolved w/ initiation of omeprazole which will be continued for 3-6mos. Discussed potential need for EGD if symptoms resume after planned discontinuation of PPI.  Orders:  -     omeprazole (priLOSEC) 20 MG capsule; Take 1 capsule by mouth Daily for 90 days.  Dispense: 90 capsule; Refill: 1    2. Sweating increase  Comments:  Chronic and active. Most likely dut to patient being post-menopausal. recent labs (cbc, thyroids) were normal.     3. Allergy, subsequent encounter  Comments:  Chronic, remains active. No improvement w/ zyrtec. Starting singulair.  Orders:  -     montelukast (Singulair) 10 MG tablet; Take 1 tablet by mouth Every Night for 30 days.  Dispense: 30 tablet; Refill: 2    4. Tick bite of left shoulder, initial encounter  Comments:  Recent tick bite, unsure of how long tick was present. Doxycline sent in.  Orders:  -     doxycycline 200 MG tablet delayed-release enteric coated tablet; Take 150 mg by mouth 1 (One) Time for 1 dose.  Dispense: 1 tablet; Refill: 0        Follow Up     Return in about 3 months (around 9/14/2023) for sweating, allergies .    Patient was given instructions and counseling regarding her condition or  for health maintenance advice. Please see specific information pulled into the AVS if appropriate.     Candelaria Owens MD   Internal Medicine-Pediatrics   Answers submitted by the patient for this visit:  Other (Submitted on 6/8/2023)  Please describe your symptoms.: Excess sweating and to check up on allergy med's  Have you had these symptoms before?: Yes  How long have you been having these symptoms?: 1-4 days  Primary Reason for Visit (Submitted on 6/8/2023)  What is the primary reason for your visit?: Other

## 2023-09-15 ENCOUNTER — OFFICE VISIT (OUTPATIENT)
Dept: INTERNAL MEDICINE | Facility: CLINIC | Age: 63
End: 2023-09-15
Payer: COMMERCIAL

## 2023-09-15 VITALS
SYSTOLIC BLOOD PRESSURE: 130 MMHG | WEIGHT: 172.38 LBS | HEIGHT: 63 IN | OXYGEN SATURATION: 99 % | DIASTOLIC BLOOD PRESSURE: 86 MMHG | BODY MASS INDEX: 30.54 KG/M2 | TEMPERATURE: 97.8 F | HEART RATE: 76 BPM

## 2023-09-15 DIAGNOSIS — Z23 INFLUENZA VACCINE NEEDED: ICD-10-CM

## 2023-09-15 DIAGNOSIS — Z12.4 CERVICAL CANCER SCREENING: ICD-10-CM

## 2023-09-15 DIAGNOSIS — L98.9 SKIN LESION: Primary | ICD-10-CM

## 2023-09-15 DIAGNOSIS — R09.82 PND (POST-NASAL DRIP): ICD-10-CM

## 2023-09-15 DIAGNOSIS — R05.3 CHRONIC COUGH: ICD-10-CM

## 2023-09-15 LAB
25(OH)D3 SERPL-MCNC: 38.6 NG/ML (ref 30–100)
ALBUMIN SERPL-MCNC: 4.3 G/DL (ref 3.5–5.2)
ALBUMIN/GLOB SERPL: 1.7 G/DL
ALP SERPL-CCNC: 105 U/L (ref 39–117)
ALT SERPL W P-5'-P-CCNC: 17 U/L (ref 1–33)
ANION GAP SERPL CALCULATED.3IONS-SCNC: 9 MMOL/L (ref 5–15)
AST SERPL-CCNC: 20 U/L (ref 1–32)
BASOPHILS # BLD AUTO: 0.04 10*3/MM3 (ref 0–0.2)
BASOPHILS NFR BLD AUTO: 0.6 % (ref 0–1.5)
BILIRUB SERPL-MCNC: 0.4 MG/DL (ref 0–1.2)
BUN SERPL-MCNC: 18 MG/DL (ref 8–23)
BUN/CREAT SERPL: 16.1 (ref 7–25)
CALCIUM SPEC-SCNC: 9.6 MG/DL (ref 8.6–10.5)
CHLORIDE SERPL-SCNC: 106 MMOL/L (ref 98–107)
CO2 SERPL-SCNC: 27 MMOL/L (ref 22–29)
CREAT SERPL-MCNC: 1.12 MG/DL (ref 0.57–1)
DEPRECATED RDW RBC AUTO: 42 FL (ref 37–54)
EGFRCR SERPLBLD CKD-EPI 2021: 55 ML/MIN/1.73
EOSINOPHIL # BLD AUTO: 0.1 10*3/MM3 (ref 0–0.4)
EOSINOPHIL NFR BLD AUTO: 1.4 % (ref 0.3–6.2)
ERYTHROCYTE [DISTWIDTH] IN BLOOD BY AUTOMATED COUNT: 12.6 % (ref 12.3–15.4)
GLOBULIN UR ELPH-MCNC: 2.5 GM/DL
GLUCOSE SERPL-MCNC: 85 MG/DL (ref 65–99)
HBA1C MFR BLD: 5.4 % (ref 4.8–5.6)
HCT VFR BLD AUTO: 43.5 % (ref 34–46.6)
HCV AB SER DONR QL: NORMAL
HGB BLD-MCNC: 15.2 G/DL (ref 12–15.9)
IMM GRANULOCYTES # BLD AUTO: 0.01 10*3/MM3 (ref 0–0.05)
IMM GRANULOCYTES NFR BLD AUTO: 0.1 % (ref 0–0.5)
LYMPHOCYTES # BLD AUTO: 1.87 10*3/MM3 (ref 0.7–3.1)
LYMPHOCYTES NFR BLD AUTO: 26.1 % (ref 19.6–45.3)
MCH RBC QN AUTO: 32 PG (ref 26.6–33)
MCHC RBC AUTO-ENTMCNC: 34.9 G/DL (ref 31.5–35.7)
MCV RBC AUTO: 91.6 FL (ref 79–97)
MONOCYTES # BLD AUTO: 0.52 10*3/MM3 (ref 0.1–0.9)
MONOCYTES NFR BLD AUTO: 7.3 % (ref 5–12)
NEUTROPHILS NFR BLD AUTO: 4.62 10*3/MM3 (ref 1.7–7)
NEUTROPHILS NFR BLD AUTO: 64.5 % (ref 42.7–76)
NRBC BLD AUTO-RTO: 0 /100 WBC (ref 0–0.2)
PLATELET # BLD AUTO: 204 10*3/MM3 (ref 140–450)
PMV BLD AUTO: 10.4 FL (ref 6–12)
POTASSIUM SERPL-SCNC: 4.4 MMOL/L (ref 3.5–5.2)
PROT SERPL-MCNC: 6.8 G/DL (ref 6–8.5)
RBC # BLD AUTO: 4.75 10*6/MM3 (ref 3.77–5.28)
SODIUM SERPL-SCNC: 142 MMOL/L (ref 136–145)
T4 FREE SERPL-MCNC: 1.19 NG/DL (ref 0.93–1.7)
TSH SERPL DL<=0.05 MIU/L-ACNC: 3.57 UIU/ML (ref 0.27–4.2)
WBC NRBC COR # BLD: 7.16 10*3/MM3 (ref 3.4–10.8)

## 2023-09-15 PROCEDURE — 82306 VITAMIN D 25 HYDROXY: CPT | Performed by: STUDENT IN AN ORGANIZED HEALTH CARE EDUCATION/TRAINING PROGRAM

## 2023-09-15 PROCEDURE — 83036 HEMOGLOBIN GLYCOSYLATED A1C: CPT | Performed by: STUDENT IN AN ORGANIZED HEALTH CARE EDUCATION/TRAINING PROGRAM

## 2023-09-15 PROCEDURE — 80050 GENERAL HEALTH PANEL: CPT | Performed by: STUDENT IN AN ORGANIZED HEALTH CARE EDUCATION/TRAINING PROGRAM

## 2023-09-15 PROCEDURE — 84439 ASSAY OF FREE THYROXINE: CPT | Performed by: STUDENT IN AN ORGANIZED HEALTH CARE EDUCATION/TRAINING PROGRAM

## 2023-09-15 PROCEDURE — 86803 HEPATITIS C AB TEST: CPT | Performed by: STUDENT IN AN ORGANIZED HEALTH CARE EDUCATION/TRAINING PROGRAM

## 2023-09-15 RX ORDER — OMEPRAZOLE 40 MG/1
40 CAPSULE, DELAYED RELEASE ORAL DAILY
COMMUNITY

## 2023-09-15 RX ORDER — LORATADINE 10 MG/1
10 TABLET ORAL DAILY
Qty: 30 TABLET | Refills: 1 | Status: SHIPPED | OUTPATIENT
Start: 2023-09-15

## 2023-09-15 NOTE — PROGRESS NOTES
"Chief Complaint  Follow-up (3 month follow up - ), Cough, Heartburn, and Earache (Small bump- on left ear )    Subjective            Sandy Boothe presents to [facility]  History of Present Illness    Left ear bump:  States years ago she \"blisters her ears in [place]\".  She lived in [place] for 3 years.   States she felt a small bump over the left ear, and feels it randomly on occasion. States it feels a little crusty,states she's never tried to take it off. States it does hurt at times when  she lays on her left ear.    Cough:  Chronic, on singulair which she is tolerating well.   States cough has been going for so long that she does not expect it to go away.   Did get flonase but only tried it for 1 week.   Not using cetirizine anymore.     States her daughter has bad allergies. She herself has never been screened or seen by an allergist.     Heartburn:  Doing well on omeprazole.     Sweating with activity only.        Past Medical History:   Diagnosis Date    Thyroid disease        Allergies:   No Known Allergies       Past Surgical History:   Procedure Laterality Date    COLONOSCOPY      COLONOSCOPY N/A 8/24/2022    Procedure: COLONOSCOPY WITH POLYPECTOMY;  Surgeon: Hosea Rose MD;  Location: [facility] ENDOSCOPY;  Service: Gastroenterology;  Laterality: N/A;  COLON POLYP, DIVERTICULOSIS          Social History     Socioeconomic History    Marital status:    Tobacco Use    Smoking status: Never    Smokeless tobacco: Never   Vaping Use    Vaping Use: Never used   Substance and Sexual Activity    Alcohol use: Not Currently     Alcohol/week: 2.0 standard drinks     Types: 2 Shots of liquor per week     Comment: Social    Drug use: Never    Sexual activity: Defer     Birth control/protection: Post-menopausal         Family History   Problem Relation Age of Onset    Heart attack Father     Pancreatic cancer Father     Breast cancer Sister     Breast cancer " "Maternal Grandmother     Ovarian cancer Neg Hx     Uterine cancer Neg Hx     Colon cancer Neg Hx     Melanoma Neg Hx     Prostate cancer Neg Hx     Deep vein thrombosis Neg Hx           Health Maintenance Due   Topic Date Due    BMI FOLLOWUP  Never done    TDAP/TD VACCINES (1 - Tdap) Never done    COVID-19 Vaccine (6 - 2023-24 season) 09/01/2023            Current Outpatient Medications:     methIMAzole (TAPAZOLE) 5 MG tablet, Take 1 tablet by mouth 3 (Three) Times a Day., Disp: , Rfl:     omeprazole (priLOSEC) 40 MG capsule, Take 1 capsule by mouth Daily., Disp: , Rfl:     loratadine (Claritin) 10 MG tablet, Take 1 tablet by mouth Daily., Disp: 30 tablet, Rfl: 1    montelukast (Singulair) 10 MG tablet, Take 1 tablet by mouth Every Night for 30 days., Disp: 30 tablet, Rfl: 2      Immunization History   Administered Date(s) Administered    COVID-19 (PFIZER) BIVALENT 12+YRS 10/14/2022    COVID-19 (PFIZER) Purple Cap Monovalent 04/08/2021, 04/29/2021, 11/24/2021, 10/14/2022    Fluzone (or Fluarix & Flulaval for VFC) >6mos 09/15/2023    Fluzone Quad >6mos (Multi-dose) 10/27/2021    Influenza Injectable Mdck Pf Quad 10/14/2022    Influenza Quad Vaccine (Inpatient) 10/14/2022    Influenza, Unspecified 10/14/2022    Shingrix 10/27/2021         Review of Systems       Objective       Vitals:    09/15/23 1023   BP: 130/86   Pulse: 76   Temp: 97.8 °F (36.6 °C)   SpO2: 99%   Weight: 78.2 kg (172 lb 6 oz)   Height: 160 cm (62.99\")     Body mass index is 30.54 kg/m².      Physical Exam  Vitals reviewed.   Constitutional:       Appearance: Normal appearance.   HENT:      Head: Normocephalic and atraumatic.      Nose: Nose normal.   Eyes:      Extraocular Movements: Extraocular movements intact.      Conjunctiva/sclera: Conjunctivae normal.   Cardiovascular:      Rate and Rhythm: Normal rate and regular rhythm.      Pulses: Normal pulses.      Heart sounds: Normal heart sounds.   Pulmonary:      Effort: Pulmonary effort is normal. " No respiratory distress.   Musculoskeletal:         General: Normal range of motion.   Skin:     General: Skin is warm and dry.      Comments: Flat, dry patch over top of left year.   No ulceration, or bleeding.    Neurological:      General: No focal deficit present.      Mental Status: She is alert and oriented to person, place, and time.      Cranial Nerves: No cranial nerve deficit.   Psychiatric:         Mood and Affect: Mood normal.         Behavior: Behavior normal.         Thought Content: Thought content normal.           Result Review :                           Assessment and Plan      Diagnoses and all orders for this visit:    1. Skin lesion (Primary)  Comments:  Pt sensation of dry patch over top of left ear, w/ hx of significant sun exposure. REferring to dermatololgy    2. Chronic cough  Comments:  Chronic, starting calritin. REferring to allergist, given other accompanying symptoms.  Orders:  -     Ambulatory Referral to Allergy  -     loratadine (Claritin) 10 MG tablet; Take 1 tablet by mouth Daily.  Dispense: 30 tablet; Refill: 1    3. PND (post-nasal drip)  -     Ambulatory Referral to Allergy    4. Influenza vaccine needed  -     Fluzone (or Fluarix & Flulaval for VFC) >6mos    5. Cervical cancer screening  -     Ambulatory Referral to Obstetrics / Gynecology                  Follow Up     Return in about 3 months (around 12/15/2023) for cough, ear lesion .    Patient was given instructions and counseling regarding her condition or for health maintenance advice. Please see specific information pulled into the AVS if appropriate.     Candelaria wOens MD   Internal Medicine-Pediatrics

## 2023-09-18 DIAGNOSIS — N28.9 ABNORMAL KIDNEY FUNCTION: Primary | ICD-10-CM

## 2023-09-22 ENCOUNTER — HOSPITAL ENCOUNTER (OUTPATIENT)
Dept: MAMMOGRAPHY | Facility: HOSPITAL | Age: 63
Discharge: HOME | End: 2023-09-22
Admitting: STUDENT IN AN ORGANIZED HEALTH CARE EDUCATION/TRAINING PROGRAM
Payer: COMMERCIAL

## 2023-09-22 DIAGNOSIS — Z12.31 SCREENING MAMMOGRAM, ENCOUNTER FOR: ICD-10-CM

## 2023-09-22 PROCEDURE — 77067 SCR MAMMO BI INCL CAD: CPT

## 2023-09-22 PROCEDURE — 77063 BREAST TOMOSYNTHESIS BI: CPT

## 2023-09-26 ENCOUNTER — OFFICE VISIT (OUTPATIENT)
Dept: OBSTETRICS AND GYNECOLOGY | Facility: CLINIC | Age: 63
End: 2023-09-26
Payer: COMMERCIAL

## 2023-09-26 ENCOUNTER — CLINICAL SUPPORT (OUTPATIENT)
Dept: INTERNAL MEDICINE | Facility: CLINIC | Age: 63
End: 2023-09-26
Payer: COMMERCIAL

## 2023-09-26 VITALS
HEIGHT: 63 IN | HEART RATE: 80 BPM | DIASTOLIC BLOOD PRESSURE: 84 MMHG | WEIGHT: 173 LBS | SYSTOLIC BLOOD PRESSURE: 135 MMHG | BODY MASS INDEX: 30.65 KG/M2

## 2023-09-26 DIAGNOSIS — Z01.419 ENCOUNTER FOR GYNECOLOGICAL EXAMINATION WITHOUT ABNORMAL FINDING: Primary | ICD-10-CM

## 2023-09-26 DIAGNOSIS — N28.9 ABNORMAL KIDNEY FUNCTION: ICD-10-CM

## 2023-09-26 LAB
ANION GAP SERPL CALCULATED.3IONS-SCNC: 8.8 MMOL/L (ref 5–15)
BUN SERPL-MCNC: 19 MG/DL (ref 8–23)
BUN/CREAT SERPL: 16.2 (ref 7–25)
CALCIUM SPEC-SCNC: 9.8 MG/DL (ref 8.6–10.5)
CHLORIDE SERPL-SCNC: 108 MMOL/L (ref 98–107)
CHOLEST SERPL-MCNC: 234 MG/DL (ref 0–200)
CO2 SERPL-SCNC: 26.2 MMOL/L (ref 22–29)
CREAT SERPL-MCNC: 1.17 MG/DL (ref 0.57–1)
EGFRCR SERPLBLD CKD-EPI 2021: 52.2 ML/MIN/1.73
GLUCOSE SERPL-MCNC: 91 MG/DL (ref 65–99)
HDLC SERPL-MCNC: 65 MG/DL (ref 40–60)
LDLC SERPL CALC-MCNC: 157 MG/DL (ref 0–100)
LDLC/HDLC SERPL: 2.38 {RATIO}
POTASSIUM SERPL-SCNC: 4.5 MMOL/L (ref 3.5–5.2)
SODIUM SERPL-SCNC: 143 MMOL/L (ref 136–145)
TRIGL SERPL-MCNC: 72 MG/DL (ref 0–150)
VLDLC SERPL-MCNC: 12 MG/DL (ref 5–40)

## 2023-09-26 PROCEDURE — 80061 LIPID PANEL: CPT | Performed by: STUDENT IN AN ORGANIZED HEALTH CARE EDUCATION/TRAINING PROGRAM

## 2023-09-26 PROCEDURE — 36415 COLL VENOUS BLD VENIPUNCTURE: CPT | Performed by: STUDENT IN AN ORGANIZED HEALTH CARE EDUCATION/TRAINING PROGRAM

## 2023-09-26 PROCEDURE — 87624 HPV HI-RISK TYP POOLED RSLT: CPT

## 2023-09-26 PROCEDURE — G0123 SCREEN CERV/VAG THIN LAYER: HCPCS

## 2023-09-26 PROCEDURE — 80048 BASIC METABOLIC PNL TOTAL CA: CPT | Performed by: STUDENT IN AN ORGANIZED HEALTH CARE EDUCATION/TRAINING PROGRAM

## 2023-09-26 NOTE — PROGRESS NOTES
"Well Woman Visit    CC: Annual well woman exam       HPI:   63 y.o. Contraception or HRT: Post menopausal, using no HRT  Menses:  none  Pain:  None  Incontinence concerns: No  Hx of abnormal pap:  No  Pt has no complaints today.      History: PMHx, Meds, Allergies, PSHx, Social Hx, and POBHx all reviewed and updated.      PHYSICAL EXAM:  /84   Pulse 80   Ht 160 cm (62.99\")   Wt 78.5 kg (173 lb)   BMI 30.66 kg/m²   General- NAD, alert and oriented, appropriate  Psych- Normal mood, good memory  Neck- No masses, no thyroid enlargement  CV- Regular rhythm, no murnurs  Resp- CTA to bases, no wheezes  Abdomen- Soft, non distended, non tender, no masses    Breast left-  Bilaterally symmetrical, no masses, non tender, no nipple discharge  Breast right- Bilaterally symmetrical, no masses, non tender, no nipple discharge    External genitalia- Normal female, no lesions  Urethra/meatus- Normal, no masses, non tender, no prolapse  Bladder- Normal, no masses, non tender, no prolapse  Vagina- + atrophy, no lesions, no discharge, no prolapse  Cvx- Normal, no lesions, no discharge, No cervical motion tenderness, stenotic cervix  Uterus- Normal size, shape & consistency.  Non tender, mobile, & no prolapse  Adnexa- No mass, non tender  Anus/Rectum/Perineum- Small hemorrhoids, non thrombosed, Hemoccult neg    Lymphatic- No palpable neck, axillary, or groin nodes  Ext- No edema, no cyanosis    Skin- No lesions, no rashes, no acanthosis nigricans        ASSESSMENT and PLAN:  WWE    Diagnoses and all orders for this visit:    1. Encounter for gynecological examination without abnormal finding (Primary)  -     IgP, Aptima HPV        Domestic violence/abuse screen: negative    Depression screen: no SI    Preventative:   BREAST HEALTH- Monthly self breast exam importance and how to reviewed. MMG and/or MRI (prn) reviewed per society guidelines and her individual history. Mammo/MRI screen: Already up to date.  CERVICAL " CANCER Screening- Reviewed current ASCCP guidelines for screening w and wo cotest HPV, age specific.  Screen: Updated today.  COLON CANCER Screening- Reviewed current medical society guidelines and options.  Colonoscopy screen:  Already up to date.  SEXUAL HEALTH: Declines STD screening.  BONE HEALTH- Reviewed current medical society guidelines and options for testing, calcium and vit D intake.  Weight bearing exercise.  DEXA: Already up to date.  VACCINATIONS Recommended: Flu annually, Zoster (51yo and older).  Importance discussed, risk being unvaccinated reviewed.  Questions answered  Smoking status- NON SMOKER.  Importance of avoiding second hand smoke.  Follow up PCP/Specialist PMHx and Labs  [facility] : does not qualify      She understands the importance of having any ordered tests to be performed in a timely fashion.  She is encouraged to review her results online and/or contact or office if she has questions.     Follow Up:  Return if symptoms worsen or fail to improve.      Jill Dominguez, APRN  09/26/2023

## 2023-09-26 NOTE — PROGRESS NOTES
Venipuncture Blood Specimen Collection  Venipuncture performed in [facility] by Marcy Vanessa RN with good hemostasis. Patient tolerated the procedure well without complications.   09/26/23   Marcy Vanessa RN

## 2023-09-28 ENCOUNTER — TELEPHONE (OUTPATIENT)
Dept: INTERNAL MEDICINE | Facility: CLINIC | Age: 63
End: 2023-09-28
Payer: COMMERCIAL

## 2023-09-28 LAB
CYTOLOGIST CVX/VAG CYTO: NORMAL
CYTOLOGY CVX/VAG DOC CYTO: NORMAL
CYTOLOGY CVX/VAG DOC THIN PREP: NORMAL
DX ICD CODE: NORMAL
HIV 1 & 2 AB SER-IMP: NORMAL
HPV I/H RISK 4 DNA CVX QL PROBE+SIG AMP: NEGATIVE
OTHER STN SPEC: NORMAL
STAT OF ADQ CVX/VAG CYTO-IMP: NORMAL

## 2023-10-01 DIAGNOSIS — N28.9 ABNORMAL KIDNEY FUNCTION: Primary | ICD-10-CM

## 2023-10-02 DIAGNOSIS — L98.9 SKIN LESION: Primary | ICD-10-CM

## 2023-10-05 RX ORDER — TRIAMCINOLONE ACETONIDE 1 MG/G
1 OINTMENT TOPICAL 2 TIMES DAILY
Qty: 30 G | Refills: 0 | Status: SHIPPED | OUTPATIENT
Start: 2023-10-05

## 2023-10-16 ENCOUNTER — HOSPITAL ENCOUNTER (OUTPATIENT)
Dept: ULTRASOUND IMAGING | Facility: HOSPITAL | Age: 63
Discharge: HOME | End: 2023-10-16
Admitting: STUDENT IN AN ORGANIZED HEALTH CARE EDUCATION/TRAINING PROGRAM
Payer: COMMERCIAL

## 2023-10-16 PROCEDURE — 76775 US EXAM ABDO BACK WALL LIM: CPT

## 2023-10-18 DIAGNOSIS — N28.9 ABNORMAL KIDNEY FUNCTION: Primary | ICD-10-CM

## 2024-01-16 ENCOUNTER — OFFICE VISIT (OUTPATIENT)
Dept: INTERNAL MEDICINE | Facility: CLINIC | Age: 64
End: 2024-01-16
Payer: COMMERCIAL

## 2024-01-16 VITALS
BODY MASS INDEX: 30.79 KG/M2 | DIASTOLIC BLOOD PRESSURE: 98 MMHG | TEMPERATURE: 97.8 F | WEIGHT: 173.8 LBS | HEIGHT: 63 IN | HEART RATE: 78 BPM | OXYGEN SATURATION: 98 % | SYSTOLIC BLOOD PRESSURE: 132 MMHG

## 2024-01-16 DIAGNOSIS — G89.29 HEEL PAIN, CHRONIC, RIGHT: ICD-10-CM

## 2024-01-16 DIAGNOSIS — Z23 IMMUNIZATION DUE: ICD-10-CM

## 2024-01-16 DIAGNOSIS — N28.9 ABNORMAL KIDNEY FUNCTION: ICD-10-CM

## 2024-01-16 DIAGNOSIS — G89.29 CHRONIC PAIN OF BOTH KNEES: Primary | ICD-10-CM

## 2024-01-16 DIAGNOSIS — M79.671 HEEL PAIN, CHRONIC, RIGHT: ICD-10-CM

## 2024-01-16 DIAGNOSIS — T78.40XD ALLERGY, SUBSEQUENT ENCOUNTER: ICD-10-CM

## 2024-01-16 DIAGNOSIS — M25.561 CHRONIC PAIN OF BOTH KNEES: Primary | ICD-10-CM

## 2024-01-16 DIAGNOSIS — M25.562 CHRONIC PAIN OF BOTH KNEES: Primary | ICD-10-CM

## 2024-01-16 DIAGNOSIS — R03.0 ELEVATED BLOOD PRESSURE READING: ICD-10-CM

## 2024-01-16 LAB
ALBUMIN SERPL-MCNC: 4.3 G/DL (ref 3.5–5.2)
ANION GAP SERPL CALCULATED.3IONS-SCNC: 11.8 MMOL/L (ref 5–15)
BUN SERPL-MCNC: 18 MG/DL (ref 8–23)
BUN/CREAT SERPL: 16.4 (ref 7–25)
CALCIUM SPEC-SCNC: 9.8 MG/DL (ref 8.6–10.5)
CHLORIDE SERPL-SCNC: 105 MMOL/L (ref 98–107)
CO2 SERPL-SCNC: 26.2 MMOL/L (ref 22–29)
CREAT SERPL-MCNC: 1.1 MG/DL (ref 0.57–1)
EGFRCR SERPLBLD CKD-EPI 2021: 56.2 ML/MIN/1.73
GLUCOSE SERPL-MCNC: 93 MG/DL (ref 65–99)
PHOSPHATE SERPL-MCNC: 3.6 MG/DL (ref 2.5–4.5)
POTASSIUM SERPL-SCNC: 4.7 MMOL/L (ref 3.5–5.2)
SODIUM SERPL-SCNC: 143 MMOL/L (ref 136–145)

## 2024-01-16 PROCEDURE — 80069 RENAL FUNCTION PANEL: CPT | Performed by: STUDENT IN AN ORGANIZED HEALTH CARE EDUCATION/TRAINING PROGRAM

## 2024-01-16 RX ORDER — LEVOCETIRIZINE DIHYDROCHLORIDE 5 MG/1
1 TABLET, FILM COATED ORAL DAILY
COMMUNITY
Start: 2023-12-29

## 2024-01-16 RX ORDER — FAMOTIDINE 20 MG/1
20 TABLET, FILM COATED ORAL DAILY
COMMUNITY
Start: 2023-11-08

## 2024-01-16 RX ORDER — OMEPRAZOLE 20 MG/1
20 CAPSULE, DELAYED RELEASE ORAL EVERY OTHER DAY
COMMUNITY
Start: 2023-12-29

## 2024-01-16 RX ORDER — ALBUTEROL SULFATE 90 UG/1
2 AEROSOL, METERED RESPIRATORY (INHALATION) EVERY 4 HOURS PRN
COMMUNITY
Start: 2023-11-08

## 2024-01-16 RX ORDER — PREDNISONE 20 MG/1
20 TABLET ORAL DAILY
COMMUNITY
Start: 2023-11-08

## 2024-01-16 RX ORDER — AZELASTINE 1 MG/ML
2 SPRAY, METERED NASAL 2 TIMES DAILY
COMMUNITY
Start: 2023-11-08

## 2024-01-16 RX ORDER — EPINEPHRINE 0.3 MG/.3ML
0.3 INJECTION, SOLUTION INTRAMUSCULAR ONCE
COMMUNITY
Start: 2023-11-13

## 2024-01-16 NOTE — PROGRESS NOTES
"Chief Complaint  Ear Lesion  (3 month f/u- Pt saw dermatology); Cough (Pt states that her Allergy shots are helping with cough and drainage. Cluster injection once a week almost done.); Hypertension (Pt states that her BP always elevated when allergist. Pt was told it was d/t the prednisone.); Heel pain, right (Feels bruised after walking to long.); and Knee Pain (Both knees hurt )    Subjective            Sandy Boothe presents to [facility]  History of Present Illness  Answers submitted by the patient for this visit:  Other (Submitted on 1/9/2024)  Please describe your symptoms.: Just a check in. will ask about blood pressure, allergy med's, right heel, and my knees  Have you had these symptoms before?: Yes  How long have you been having these symptoms?: Greater than 2 weeks  Primary Reason for Visit (Submitted on 1/9/2024)  What is the primary reason for your visit?: Other    Forehead lesion: excised lesion with pathology. Ear lesion-had injection but unchanged.   Ashleigh Lew PA-C with [facility]. [Need to request records]    Cough and drainage:   Seen by allergist and was started on injections.   States her cough and drainage are better.     Elevated bp:   Noted in office.   States bp at the allergist is typically in the 140's.   She does not check bp at home. She does not have a bp cuff at home but can get one.     Both knees hurt: states \"I got old\", \"I cannot kneel on them anywhere\". States it does not hurt when she walks, only when she goes to kneel. She has not tried any otc agents as she states she just avoids kneeling on them.     Right heel pain: feels like she has a bruise in the right heel. Worse when she walks for a long period of time. Has tried heel cups which did not make any difference.             Past Medical History:   Diagnosis Date    Allergic     Asthma     GERD (gastroesophageal reflux disease)     History of medical " problems degenerative disc disease    Hyperthyroidism     Thyroid disease        Allergies:   No Known Allergies       Past Surgical History:   Procedure Laterality Date    COLONOSCOPY      COLONOSCOPY N/A 8/24/2022    Procedure: COLONOSCOPY WITH POLYPECTOMY;  Surgeon: Hosea Rose MD;  Location: [facility] ENDOSCOPY;  Service: Gastroenterology;  Laterality: N/A;  COLON POLYP, DIVERTICULOSIS          Social History     Socioeconomic History    Marital status:    Tobacco Use    Smoking status: Never    Smokeless tobacco: Never    Tobacco comments:     tried it as a teenager   Vaping Use    Vaping Use: Never used   Substance and Sexual Activity    Alcohol use: Not Currently     Alcohol/week: 2.0 standard drinks of alcohol     Types: 2 Drinks containing 0.5 oz of alcohol per week     Comment: social drinker    Drug use: Never    Sexual activity: Not Currently     Birth control/protection: Post-menopausal         Family History   Problem Relation Age of Onset    Heart attack Father     Pancreatic cancer Father     Cancer Father         pancreatic    Heart disease Father     Breast cancer Sister     Breast cancer Maternal Grandmother     Cancer Sister         breast    Ovarian cancer Neg Hx     Uterine cancer Neg Hx     Colon cancer Neg Hx     Melanoma Neg Hx     Prostate cancer Neg Hx     Deep vein thrombosis Neg Hx           Health Maintenance Due   Topic Date Due    BMI FOLLOWUP  Never done    TDAP/TD VACCINES (1 - Tdap) Never done    ANNUAL PHYSICAL  01/18/2024            Current Outpatient Medications:     albuterol sulfate  (90 Base) MCG/ACT inhaler, Inhale 2 puffs Every 4 (Four) Hours As Needed for Wheezing or Shortness of Air., Disp: , Rfl:     Auvi-Q 0.3 MG/0.3ML solution auto-injector injection, Inject 0.3 mL into the appropriate muscle as directed by prescriber 1 (One) Time., Disp: , Rfl:     azelastine (ASTELIN) 0.1 % nasal spray, 2 sprays into the nostril(s) as directed by provider 2  "(Two) Times a Day., Disp: , Rfl:     famotidine (PEPCID) 20 MG tablet, Take 1 tablet by mouth Daily., Disp: , Rfl:     levocetirizine (XYZAL) 5 MG tablet, Take 1 tablet by mouth Daily., Disp: , Rfl:     methIMAzole (TAPAZOLE) 5 MG tablet, Take 1 tablet by mouth 3 (Three) Times a Day., Disp: , Rfl:     montelukast (Singulair) 10 MG tablet, Take 1 tablet by mouth Every Night for 30 days., Disp: 30 tablet, Rfl: 2    omeprazole (priLOSEC) 20 MG capsule, Take 1 capsule by mouth Every Other Day., Disp: , Rfl:     omeprazole (priLOSEC) 40 MG capsule, Take 1 capsule by mouth Daily., Disp: , Rfl:     predniSONE (DELTASONE) 20 MG tablet, Take 1 tablet by mouth Daily., Disp: , Rfl:     triamcinolone (KENALOG) 0.1 % ointment, Apply 1 application  topically to the appropriate area as directed 2 (Two) Times a Day., Disp: 30 g, Rfl: 0      Immunization History   Administered Date(s) Administered    COVID-19 (PFIZER) BIVALENT 12+YRS 10/14/2022    COVID-19 (PFIZER) Purple Cap Monovalent 04/08/2021, 04/29/2021, 11/24/2021    COVID-19 F23 (PFIZER) 12YRS+ (COMIRNATY) 01/16/2024    Fluzone (or Fluarix & Flulaval for VFC) >6mos 09/15/2023    Fluzone Quad >6mos (Multi-dose) 10/27/2021    Influenza Injectable Mdck Pf Quad 10/14/2022    Influenza Quad Vaccine (Inpatient) 10/14/2022    Influenza, Unspecified 10/14/2022    Shingrix 10/27/2021         Review of Systems       Objective       Vitals:    01/16/24 1034   BP: 132/98   Pulse: 78   Temp: 97.8 °F (36.6 °C)   TempSrc: Temporal   SpO2: 98%   Weight: 78.8 kg (173 lb 12.8 oz)   Height: 158.8 cm (62.5\")     Body mass index is 31.28 kg/m².      Physical Exam  Vitals reviewed.   Constitutional:       Appearance: Normal appearance.   HENT:      Head: Normocephalic and atraumatic.      Nose: Nose normal.   Eyes:      Extraocular Movements: Extraocular movements intact.      Conjunctiva/sclera: Conjunctivae normal.   Cardiovascular:      Rate and Rhythm: Normal rate and regular rhythm.      " Pulses: Normal pulses.      Heart sounds: Normal heart sounds.   Pulmonary:      Effort: Pulmonary effort is normal. No respiratory distress.      Breath sounds: Normal breath sounds.   Abdominal:      General: Abdomen is flat. Bowel sounds are normal.      Palpations: Abdomen is soft.   Musculoskeletal:         General: No swelling, tenderness, deformity or signs of injury. Normal range of motion.   Skin:     General: Skin is warm and dry.   Neurological:      General: No focal deficit present.      Mental Status: She is alert and oriented to person, place, and time.      Cranial Nerves: No cranial nerve deficit.   Psychiatric:         Mood and Affect: Mood normal.         Behavior: Behavior normal.         Thought Content: Thought content normal.             Result Review :                           Assessment and Plan      Diagnoses and all orders for this visit:    1. Chronic pain of both knees (Primary)  Comments:  chronic with unremarkable exam in office today. Symptoms likely due to REED. Recommend physical therapy which she will consider.    2. Elevated blood pressure reading  Comments:  Noted in office again today w/ concern for HTN. Recommend pt checks bp at home to r/u white coat HTN as possible contributing factor.    3. Heel pain, chronic, right  Comments:  Chronic and persisting. Referring to podiatry for further eval.  Orders:  -     Ambulatory Referral to Podiatry    4. Immunization due  Comments:  Due for covid booster. Administered in office today and pt tolerated well.  Orders:  -     COVID-19 F23 (Pfizer) 12yrs+ (COMIRNATY)    5. Allergy, subsequent encounter  Comments:  Pt w/ cough and nasal congestion concerning for allergic rhinitis. Recommend trial of otc agents (nasal sprays, zyrtec, etc...)    6. Abnormal kidney function  Comments:  Chronic, ckd3. Due for repeat labs.  Orders:  -     Renal Function Panel              Follow Up     Return in about 4 weeks (around 2/13/2024) for elevated blood  pressure refill.    Patient was given instructions and counseling regarding her condition or for health maintenance advice. Please see specific information pulled into the AVS if appropriate.     Candelaria Owens MD   Internal Medicine-Pediatrics

## 2024-02-19 ENCOUNTER — OFFICE VISIT (OUTPATIENT)
Dept: PODIATRY | Facility: CLINIC | Age: 64
End: 2024-02-19
Payer: COMMERCIAL

## 2024-02-19 VITALS
HEIGHT: 63 IN | TEMPERATURE: 98.4 F | WEIGHT: 177 LBS | BODY MASS INDEX: 31.36 KG/M2 | SYSTOLIC BLOOD PRESSURE: 163 MMHG | DIASTOLIC BLOOD PRESSURE: 92 MMHG | OXYGEN SATURATION: 98 % | HEART RATE: 70 BPM

## 2024-02-19 DIAGNOSIS — Q66.72 PES CAVUS OF BOTH FEET: ICD-10-CM

## 2024-02-19 DIAGNOSIS — M72.2 PLANTAR FASCIITIS: Primary | ICD-10-CM

## 2024-02-19 DIAGNOSIS — Q66.71 PES CAVUS OF BOTH FEET: ICD-10-CM

## 2024-02-19 DIAGNOSIS — M79.671 FOOT PAIN, RIGHT: ICD-10-CM

## 2024-02-19 PROCEDURE — 99203 OFFICE O/P NEW LOW 30 MIN: CPT | Performed by: PODIATRIST

## 2024-02-19 RX ORDER — DICLOFENAC SODIUM 75 MG/1
75 TABLET, DELAYED RELEASE ORAL 2 TIMES DAILY
Qty: 60 TABLET | Refills: 1 | Status: SHIPPED | OUTPATIENT
Start: 2024-02-19 | End: 2024-03-20

## 2024-02-19 NOTE — LETTER
February 19, 2024       No Recipients    Patient: Sandy Boothe   YOB: 1960   Date of Visit: 2/19/2024       Dear Candelaria Owens MD:    Thank you for referring Sandy Boothe to me for evaluation. Below are the relevant portions of my assessment and plan of care.    Encounter Diagnosis and Orders:  Diagnoses and all orders for this visit:    1. Plantar fasciitis (Primary)  -     diclofenac (VOLTAREN) 75 MG EC tablet; Take 1 tablet by mouth 2 (Two) Times a Day for 30 days.  Dispense: 60 tablet; Refill: 1    2. Foot pain, right    3. Pes cavus of both feet        If you have questions, please do not hesitate to call me. I look forward to following Sandy along with you.         Sincerely,        Zelalem Sullivan DPM        CC:   No Recipients

## 2024-02-19 NOTE — PROGRESS NOTES
[facility] - PODIATRY    Today's Date: 02/19/24    Patient Name: Sandy Boothe  MRN: [id number]  CSN: [id number]  PCP: Candelaria Owens MD  Referring Provider: Candelaria Owens MD    SUBJECTIVE     Chief Complaint   Patient presents with    Right Foot - Pain, Establish Care     Heel pain x 6 mos with walking     HPI: Sandy Boothe, a 63 y.o.female, comes to clinic.    New, Established, New Problem: New    Location: Right heel    Duration: Over the past year    Onset:  gradual    Nature:  achy, sharp, shooting    Aggravating factors:  Patient describes morning right heel pain as stabbing, burning, or aching. This pain usually subsides throughout the day, however it returns after periods of rest and sitting, when standing back up on their feet, and again the next morning.      Previous Treatment: Change in shoes    Patient denies any fevers, chills, nausea, vomiting, shortness of breath, nor any other constitutional signs nor symptoms.    Past Medical History:   Diagnosis Date    Allergic     Asthma     GERD (gastroesophageal reflux disease)     History of medical problems degenerative disc disease    Hyperthyroidism     Thyroid disease      Past Surgical History:   Procedure Laterality Date    COLONOSCOPY      COLONOSCOPY N/A 8/24/2022    Procedure: COLONOSCOPY WITH POLYPECTOMY;  Surgeon: Hosea Rose MD;  Location: [facility] ENDOSCOPY;  Service: Gastroenterology;  Laterality: N/A;  COLON POLYP, DIVERTICULOSIS     Family History   Problem Relation Age of Onset    Heart attack Father     Pancreatic cancer Father     Cancer Father         pancreatic    Heart disease Father     Breast cancer Sister     Breast cancer Maternal Grandmother     Cancer Sister         breast    Ovarian cancer Neg Hx     Uterine cancer Neg Hx     Colon cancer Neg Hx     Melanoma Neg Hx     Prostate cancer Neg Hx     Deep vein thrombosis Neg Hx      Social History     Socioeconomic History    Marital status:     Tobacco Use    Smoking status: Never    Smokeless tobacco: Never    Tobacco comments:     tried it as a teenager   Vaping Use    Vaping Use: Never used   Substance and Sexual Activity    Alcohol use: Not Currently     Alcohol/week: 2.0 standard drinks of alcohol     Types: 2 Drinks containing 0.5 oz of alcohol per week     Comment: social drinker    Drug use: Never    Sexual activity: Not Currently     Birth control/protection: Post-menopausal     No Known Allergies  Current Outpatient Medications   Medication Sig Dispense Refill    albuterol sulfate  (90 Base) MCG/ACT inhaler Inhale 2 puffs Every 4 (Four) Hours As Needed for Wheezing or Shortness of Air.      Auvi-Q 0.3 MG/0.3ML solution auto-injector injection Inject 0.3 mL into the appropriate muscle as directed by prescriber 1 (One) Time.      azelastine (ASTELIN) 0.1 % nasal spray 2 sprays into the nostril(s) as directed by provider 2 (Two) Times a Day.      diclofenac (VOLTAREN) 75 MG EC tablet Take 1 tablet by mouth 2 (Two) Times a Day for 30 days. 60 tablet 1    levocetirizine (XYZAL) 5 MG tablet Take 1 tablet by mouth Daily.      methIMAzole (TAPAZOLE) 5 MG tablet Take 1 tablet by mouth 3 (Three) Times a Day.      montelukast (Singulair) 10 MG tablet Take 1 tablet by mouth Every Night for 30 days. 30 tablet 2    omeprazole (priLOSEC) 20 MG capsule Take 1 capsule by mouth Every Other Day.      triamcinolone (KENALOG) 0.1 % ointment Apply 1 application  topically to the appropriate area as directed 2 (Two) Times a Day. 30 g 0     No current facility-administered medications for this visit.     Review of Systems   Constitutional: Negative.    Musculoskeletal:         Right heel pain   All other systems reviewed and are negative.      OBJECTIVE     Vitals:    02/19/24 1113   BP: 163/92   Pulse: 70   Temp: 98.4 °F (36.9 °C)   SpO2: 98%       PHYSICAL EXAM     Foot/Ankle Exam    GENERAL  Appearance:  appears stated age, elderly and  healthy  Orientation:  AAOx3  Affect:  appropriate  Gait:  unimpaired  Assistance:  independent  Right shoe gear: casual shoe  Left shoe gear: casual shoe    VASCULAR     Right Foot Vascularity   Dorsalis pedis:  2+  Posterior tibial:  2+  Skin temperature:  warm  Edema grading:  None  CFT:  < 3 seconds  Pedal hair growth:  Present  Varicosities:  mild varicosities     Left Foot Vascularity   Normal vascular exam    Dorsalis pedis:  2+  Posterior tibial:  2+  Skin temperature:  warm  Edema grading:  None  CFT:  < 3 seconds  Pedal hair growth:  Present  Varicosities:  none     NEUROLOGIC     Right Foot Neurologic   Normal sensation    Light touch sensation: normal  Vibratory sensation: normal  Hot/Cold sensation: normal  Protective Sensation using Newborn-Caio Monofilament:   Sites intact: 10  Sites tested: 10     Left Foot Neurologic   Normal sensation    Light touch sensation: normal  Vibratory sensation: normal  Hot/Cold sensation:  normal  Protective Sensation using Newborn-Caio Monofilament:   Sites intact: 10  Sites tested: 10    MUSCULOSKELETAL     Right Foot Musculoskeletal   Ecchymosis:  none  Tenderness:  plantar fascia tenderness    Arch:  Pes cavus     Left Foot Musculoskeletal   Arch:  Pes cavus    MUSCLE STRENGTH     Right Foot Muscle Strength   Foot dorsiflexion:  4  Foot plantar flexion:  4  Foot inversion:  4  Foot eversion:  4     Left Foot Muscle Strength   Foot dorsiflexion:  4  Foot plantar flexion:  4  Foot inversion:  4  Foot eversion:  4    RANGE OF MOTION     Right Foot Range of Motion   Foot and ankle ROM within normal limits       Left Foot Range of Motion   Foot and ankle ROM within normal limits      DERMATOLOGIC      Right Foot Dermatologic   Skin  Right foot skin is intact.   Nails comment:  Toenails 1, 2, 3, 4, and 5     Left Foot Dermatologic   Skin  Left foot skin is intact.   Nails comment:  Toenails 1, 2, 3, 4, and 5      RADIOLOGY:    XR Foot 3+ View Right    Result Date:  2/19/2024  Narrative: IN-OFFICE IMAGING:  Standing, weightbearing, 3 view, AP, MO, Lateral, Right foot Indication:  Foot Pain Findings: Pes cavus.  Inferior and posterior calcaneal enthesopathies.  No periosteal reactions nor osteolytic changes seen.  No occult fractures seen. Comparison: No comparison views available.      ASSESSMENT/PLAN     Diagnoses and all orders for this visit:    1. Plantar fasciitis (Primary)  -     diclofenac (VOLTAREN) 75 MG EC tablet; Take 1 tablet by mouth 2 (Two) Times a Day for 30 days.  Dispense: 60 tablet; Refill: 1    2. Foot pain, right    3. Pes cavus of both feet      Comprehensive lower extremity examination and evaluation was performed.    Discussed findings and treatment plan including risks, benefits, and treatment options with patient in detail. Patient agreed with treatment plan.    Treatment Options discussed:  - no treatment at all  - change in shoegear  - change in activities  - RICE therapy  - arch support  - NSAIDs  - PO steroids  - injectable steroids    Discussed proper shoegear for the patient's feet and medical condition.  Patient states understanding and agreement with this plan.    Rice Therapy: It is important to treat any injury as soon as possible to help control swelling and increase recovery time. The recognized regimen for immediate treatment of sport injuries includes rest, ice (cold application), compression, and elevation (RICE). Remove the injured athlete from play, apply ice to the affected area, wrap or compress the injured area with an elastic bandage when appropriate, and elevate the injured area above heart level to reduce swelling.  The patient is to not use ice for longer than 20 minutes at a time, with at least 20 minutes of no ice usage between applications.  The patient states understanding and agreement with this plan.    An After Visit Summary was printed and given to the patient at discharge, including (if requested) any available  informative/educational handouts regarding diagnosis, treatment, or medications. All questions were answered to patient/family satisfaction. Should symptoms fail to improve or worsen they agree to call or return to clinic or to go to the Emergency Department. Discussed the importance of following up with any needed screening tests/labs/specialist appointments and any requested follow-up recommended by me today. Importance of maintaining follow-up discussed and patient accepts that missed appointments can delay diagnosis and potentially lead to worsening of conditions.    Return if symptoms worsen or fail to improve., or sooner if acute issues arise.    This document has been electronically signed by Zelalem Sullivan DPM on February 19, 2024 12:19 EST

## 2024-03-27 ENCOUNTER — OFFICE VISIT (OUTPATIENT)
Dept: INTERNAL MEDICINE | Facility: CLINIC | Age: 64
End: 2024-03-27
Payer: COMMERCIAL

## 2024-03-27 VITALS
HEART RATE: 83 BPM | TEMPERATURE: 97.2 F | DIASTOLIC BLOOD PRESSURE: 98 MMHG | OXYGEN SATURATION: 99 % | BODY MASS INDEX: 32.11 KG/M2 | SYSTOLIC BLOOD PRESSURE: 158 MMHG | WEIGHT: 178.4 LBS

## 2024-03-27 DIAGNOSIS — R06.83 SNORING: ICD-10-CM

## 2024-03-27 DIAGNOSIS — I10 PRIMARY HYPERTENSION: Primary | ICD-10-CM

## 2024-03-27 DIAGNOSIS — E04.1 THYROID NODULE: ICD-10-CM

## 2024-03-27 DIAGNOSIS — N28.9 ABNORMAL KIDNEY FUNCTION: ICD-10-CM

## 2024-03-27 PROCEDURE — 99214 OFFICE O/P EST MOD 30 MIN: CPT | Performed by: STUDENT IN AN ORGANIZED HEALTH CARE EDUCATION/TRAINING PROGRAM

## 2024-03-27 RX ORDER — LOSARTAN POTASSIUM 25 MG/1
12.5 TABLET ORAL DAILY
Qty: 30 TABLET | Refills: 1 | Status: SHIPPED | OUTPATIENT
Start: 2024-03-27

## 2024-03-27 NOTE — PROGRESS NOTES
Chief Complaint  Elevated Blood Pressure (Follow up)    Subjective            Sandy Boothe presents to [facility]  History of Present Illness    Elevated high blood pressure:  Noted during last visit, elevated at last visit. Pt was asked to check bp at home and has log for review. SBP ranges from 137-172. She is asymptomatic.   She does snore. She has never been screen for sleep apnea.   She does not feel rested when she wakes up from sleeping.     Abnormal kidney labs with small kidneys on recent US for which she was referred to nephrology.   Seen with nephrology yesterday and had repeat labs done.   States they are planning on getting an US of her thyroid.       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 States she used to take Aleve but would like to know if this is safe to take. States symptoms are not bad enough to warrant a referral to spine specialist.       Daughter with Di-ruby syndrome recently dx with CADASIL.     Past Medical History:   Diagnosis Date    Allergic     Asthma     GERD (gastroesophageal reflux disease)     History of medical problems degenerative disc disease    Hyperthyroidism     Thyroid disease        Allergies:   No Known Allergies       Past Surgical History:   Procedure Laterality Date    COLONOSCOPY      COLONOSCOPY N/A 8/24/2022    Procedure: COLONOSCOPY WITH POLYPECTOMY;  Surgeon: Hosea Rose MD;  Location: [facility] ENDOSCOPY;  Service: Gastroenterology;  Laterality: N/A;  COLON POLYP, DIVERTICULOSIS          Social History     Socioeconomic History    Marital status:    Tobacco Use    Smoking status: Never    Smokeless tobacco: Never    Tobacco comments:     tried it as a teenager   Vaping Use    Vaping status: Never Used   Substance and Sexual Activity    Alcohol use: Not Currently     Alcohol/week: 2.0 standard drinks of alcohol     Types: 2 Drinks  containing 0.5 oz of alcohol per week     Comment: social drinker    Drug use: Never    Sexual activity: Not Currently     Birth control/protection: Post-menopausal         Family History   Problem Relation Age of Onset    Heart attack Father     Pancreatic cancer Father     Cancer Father         pancreatic    Heart disease Father     Breast cancer Sister     Breast cancer Maternal Grandmother     Cancer Sister         breast    Ovarian cancer Neg Hx     Uterine cancer Neg Hx     Colon cancer Neg Hx     Melanoma Neg Hx     Prostate cancer Neg Hx     Deep vein thrombosis Neg Hx           Health Maintenance Due   Topic Date Due    BMI FOLLOWUP  Never done    RSV Vaccine - Adults (1 - 1-dose 60+ series) Never done    ANNUAL PHYSICAL  01/18/2024            Current Outpatient Medications:     albuterol sulfate  (90 Base) MCG/ACT inhaler, Inhale 2 puffs Every 4 (Four) Hours As Needed for Wheezing or Shortness of Air., Disp: , Rfl:     Auvi-Q 0.3 MG/0.3ML solution auto-injector injection, Inject 0.3 mL into the appropriate muscle as directed by prescriber 1 (One) Time., Disp: , Rfl:     azelastine (ASTELIN) 0.1 % nasal spray, 2 sprays into the nostril(s) as directed by provider 2 (Two) Times a Day., Disp: , Rfl:     levocetirizine (XYZAL) 5 MG tablet, Take 1 tablet by mouth Daily., Disp: , Rfl:     methIMAzole (TAPAZOLE) 5 MG tablet, Take 1 tablet by mouth 3 (Three) Times a Day., Disp: , Rfl:     omeprazole (priLOSEC) 20 MG capsule, Take 1 capsule by mouth Every Other Day., Disp: , Rfl:     triamcinolone (KENALOG) 0.1 % ointment, Apply 1 application  topically to the appropriate area as directed 2 (Two) Times a Day., Disp: 30 g, Rfl: 0    losartan (Cozaar) 25 MG tablet, Take 0.5 tablets by mouth Daily., Disp: 30 tablet, Rfl: 1    montelukast (Singulair) 10 MG tablet, Take 1 tablet by mouth Every Night for 30 days., Disp: 30 tablet, Rfl: 2      Immunization History   Administered Date(s) Administered    COVID-19  (PFIZER) BIVALENT 12+YRS 10/14/2022    COVID-19 (PFIZER) Purple Cap Monovalent 04/08/2021, 04/29/2021, 11/24/2021    COVID-19 F23 (PFIZER) 12YRS+ (COMIRNATY) 01/16/2024    Fluzone (or Fluarix & Flulaval for VFC) >6mos 09/15/2023    Fluzone Quad >6mos (Multi-dose) 10/27/2021    Influenza Injectable Mdck Pf Quad 10/14/2022    Influenza Quad Vaccine (Inpatient) 10/14/2022    Influenza, Unspecified 10/14/2022    Shingrix 10/27/2021         Review of Systems       Objective       Vitals:    03/27/24 1101   BP: 158/98   BP Location: Right arm   Patient Position: Sitting   Cuff Size: Adult   Pulse: 83   Temp: 97.2 °F (36.2 °C)   TempSrc: Temporal   SpO2: 99%   Weight: 80.9 kg (178 lb 6.4 oz)     Body mass index is 32.11 kg/m².      Physical Exam  Vitals reviewed.   Constitutional:       Appearance: Normal appearance.   HENT:      Head: Normocephalic and atraumatic.      Nose: Nose normal.   Eyes:      Extraocular Movements: Extraocular movements intact.      Conjunctiva/sclera: Conjunctivae normal.   Cardiovascular:      Rate and Rhythm: Normal rate and regular rhythm.      Pulses: Normal pulses.      Heart sounds: Normal heart sounds.   Pulmonary:      Effort: Pulmonary effort is normal. No respiratory distress.      Breath sounds: Normal breath sounds.   Musculoskeletal:         General: Normal range of motion.   Skin:     General: Skin is warm and dry.   Neurological:      General: No focal deficit present.      Mental Status: She is alert and oriented to person, place, and time.      Cranial Nerves: No cranial nerve deficit.   Psychiatric:         Mood and Affect: Mood normal.         Behavior: Behavior normal.         Thought Content: Thought content normal.             Result Review :                           Assessment and Plan      Assessment & Plan  Primary hypertension  New diagnosis. Pt also w/ snoring and non-restful sleep, screening for REED. Starting losartan 12.5mg daily. Close f/u in 6 wks.  Pt to continue  checking bp at home and bring log for review at f/u.  Thyroid nodule  Hx of. Last US was done in 2019 and in [place] (provider is not able to review images findings). Repeat US is being ordered by nephrology. Reviewed labs done yesterday at [place] with normal thyroid labs .  Abnormal kidney function  Noted on previous 2 labs, now normalized. Pt was referred to nephrology. UA ordered to evaluate for proteinuria. Losartan being prescribed per above   Snoring  Chronic, referring to sleep medicine.     Orders Placed This Encounter   Procedures    Urinalysis With Culture If Indicated -    Ambulatory Referral to Sleep Medicine     New Medications Ordered This Visit   Medications    losartan (Cozaar) 25 MG tablet     Sig: Take 0.5 tablets by mouth Daily.     Dispense:  30 tablet     Refill:  1             Follow Up     Return in about 6 weeks (around 5/8/2024) for HTN.    Patient was given instructions and counseling regarding her condition or for health maintenance advice. Please see specific information pulled into the AVS if appropriate.     Candelaria Owens MD   Internal Medicine-Pediatrics

## 2024-04-23 ENCOUNTER — TRANSCRIBE ORDERS (OUTPATIENT)
Dept: ADMINISTRATIVE | Facility: HOSPITAL | Age: 64
End: 2024-04-23
Payer: COMMERCIAL

## 2024-04-23 DIAGNOSIS — E04.1 THYROID NODULE: Primary | ICD-10-CM

## 2024-04-24 ENCOUNTER — HOSPITAL ENCOUNTER (OUTPATIENT)
Dept: OTHER | Facility: HOSPITAL | Age: 64
Discharge: HOME | End: 2024-04-24

## 2024-05-07 ENCOUNTER — HOSPITAL ENCOUNTER (OUTPATIENT)
Dept: ULTRASOUND IMAGING | Facility: HOSPITAL | Age: 64
Discharge: HOME | End: 2024-05-07
Admitting: STUDENT IN AN ORGANIZED HEALTH CARE EDUCATION/TRAINING PROGRAM
Payer: COMMERCIAL

## 2024-05-07 DIAGNOSIS — E04.1 THYROID NODULE: ICD-10-CM

## 2024-05-07 PROCEDURE — 25010000002 LIDOCAINE 1 % SOLUTION

## 2024-05-07 PROCEDURE — 76942 ECHO GUIDE FOR BIOPSY: CPT

## 2024-05-07 PROCEDURE — 88173 CYTOPATH EVAL FNA REPORT: CPT | Performed by: STUDENT IN AN ORGANIZED HEALTH CARE EDUCATION/TRAINING PROGRAM

## 2024-05-07 RX ORDER — LIDOCAINE HYDROCHLORIDE 10 MG/ML
INJECTION, SOLUTION INFILTRATION; PERINEURAL
Status: COMPLETED
Start: 2024-05-07 | End: 2024-05-07

## 2024-05-07 RX ADMIN — LIDOCAINE HYDROCHLORIDE: 10 INJECTION, SOLUTION INFILTRATION; PERINEURAL at 13:35

## 2024-05-08 ENCOUNTER — OFFICE VISIT (OUTPATIENT)
Dept: INTERNAL MEDICINE | Facility: CLINIC | Age: 64
End: 2024-05-08
Payer: COMMERCIAL

## 2024-05-08 VITALS
SYSTOLIC BLOOD PRESSURE: 142 MMHG | OXYGEN SATURATION: 95 % | BODY MASS INDEX: 32.33 KG/M2 | WEIGHT: 179.6 LBS | DIASTOLIC BLOOD PRESSURE: 92 MMHG | TEMPERATURE: 97.1 F | HEART RATE: 70 BPM

## 2024-05-08 DIAGNOSIS — I10 PRIMARY HYPERTENSION: Primary | ICD-10-CM

## 2024-05-08 DIAGNOSIS — E04.1 THYROID NODULE: ICD-10-CM

## 2024-05-08 DIAGNOSIS — J30.9 ALLERGIC RHINITIS, UNSPECIFIED SEASONALITY, UNSPECIFIED TRIGGER: ICD-10-CM

## 2024-05-08 DIAGNOSIS — Z76.0 MEDICATION REFILL: ICD-10-CM

## 2024-05-08 DIAGNOSIS — L98.9 FINGER LESION: ICD-10-CM

## 2024-05-08 PROCEDURE — 99213 OFFICE O/P EST LOW 20 MIN: CPT | Performed by: STUDENT IN AN ORGANIZED HEALTH CARE EDUCATION/TRAINING PROGRAM

## 2024-05-08 RX ORDER — OMEPRAZOLE 20 MG/1
20 CAPSULE, DELAYED RELEASE ORAL EVERY OTHER DAY
Qty: 90 CAPSULE | Refills: 1 | Status: SHIPPED | OUTPATIENT
Start: 2024-05-08

## 2024-05-08 NOTE — PROGRESS NOTES
Chief Complaint  Hypertension (Follow up)    Subjective            Sandy Boothe presents to [facility]  History of Present Illness  Answers submitted by the patient for this visit:  Primary Reason for Visit (Submitted on 5/3/2024)  What is the primary reason for your visit?: High Blood Pressure  Hypertension :   Chronic, presenting for f/u.   Has bp log for review which shows sbp ranging from 134-158, w/ dbp ranging from .     Thyroid nodule:  Had biopsy done yesterday. Awaiting result.       Endorses wheezing with worsening drainage over the past few days. States she is taking all of her allergy medications and finding herself using her albuterol inhaler more often. Denies any respiratory distress.       Past Medical History:   Diagnosis Date    Allergic     Asthma     GERD (gastroesophageal reflux disease)     History of medical problems degenerative disc disease    Hyperthyroidism     Thyroid disease        Allergies:   No Known Allergies       Past Surgical History:   Procedure Laterality Date    COLONOSCOPY      COLONOSCOPY N/A 8/24/2022    Procedure: COLONOSCOPY WITH POLYPECTOMY;  Surgeon: Hosea Rose MD;  Location: [facility] ENDOSCOPY;  Service: Gastroenterology;  Laterality: N/A;  COLON POLYP, DIVERTICULOSIS          Social History     Socioeconomic History    Marital status:    Tobacco Use    Smoking status: Never    Smokeless tobacco: Never    Tobacco comments:     tried it as a teenager   Vaping Use    Vaping status: Never Used   Substance and Sexual Activity    Alcohol use: Not Currently     Alcohol/week: 2.0 standard drinks of alcohol     Types: 2 Drinks containing 0.5 oz of alcohol per week     Comment: social drinker    Drug use: Never    Sexual activity: Not Currently     Birth control/protection: Post-menopausal         Family History   Problem Relation Age of Onset    Heart attack Father     Pancreatic cancer Father      Cancer Father         pancreatic    Heart disease Father     Breast cancer Sister     Breast cancer Maternal Grandmother     Cancer Sister         breast    Ovarian cancer Neg Hx     Uterine cancer Neg Hx     Colon cancer Neg Hx     Melanoma Neg Hx     Prostate cancer Neg Hx     Deep vein thrombosis Neg Hx           Health Maintenance Due   Topic Date Due    BMI FOLLOWUP  Never done    RSV Vaccine - Adults (1 - 1-dose 60+ series) Never done    ANNUAL PHYSICAL  01/18/2024            Current Outpatient Medications:     albuterol sulfate  (90 Base) MCG/ACT inhaler, Inhale 2 puffs Every 4 (Four) Hours As Needed for Wheezing or Shortness of Air., Disp: , Rfl:     Auvi-Q 0.3 MG/0.3ML solution auto-injector injection, Inject 0.3 mL into the appropriate muscle as directed by prescriber 1 (One) Time., Disp: , Rfl:     azelastine (ASTELIN) 0.1 % nasal spray, 2 sprays into the nostril(s) as directed by provider 2 (Two) Times a Day., Disp: , Rfl:     levocetirizine (XYZAL) 5 MG tablet, Take 1 tablet by mouth Daily., Disp: , Rfl:     losartan (Cozaar) 25 MG tablet, Take 0.5 tablets by mouth Daily., Disp: 30 tablet, Rfl: 1    methIMAzole (TAPAZOLE) 5 MG tablet, Take 1 tablet by mouth 3 (Three) Times a Day., Disp: , Rfl:     omeprazole (priLOSEC) 20 MG capsule, Take 1 capsule by mouth Every Other Day., Disp: 90 capsule, Rfl: 1    triamcinolone (KENALOG) 0.1 % ointment, Apply 1 application  topically to the appropriate area as directed 2 (Two) Times a Day., Disp: 30 g, Rfl: 0    montelukast (Singulair) 10 MG tablet, Take 1 tablet by mouth Every Night for 30 days., Disp: 30 tablet, Rfl: 2      Immunization History   Administered Date(s) Administered    COVID-19 (PFIZER) BIVALENT 12+YRS 10/14/2022    COVID-19 (PFIZER) Purple Cap Monovalent 04/08/2021, 04/29/2021, 11/24/2021    COVID-19 F23 (PFIZER) 12YRS+ (COMIRNATY) 01/16/2024    Fluzone (or Fluarix & Flulaval for VFC) >6mos 09/15/2023    Fluzone Quad >6mos (Multi-dose)  10/27/2021    Influenza Injectable Mdck Pf Quad 10/14/2022    Influenza Quad Vaccine (Inpatient) 10/14/2022    Influenza, Unspecified 10/14/2022    Shingrix 10/27/2021         Review of Systems       Objective       Vitals:    05/08/24 1137   BP: 142/92   BP Location: Left arm   Patient Position: Sitting   Cuff Size: Adult   Pulse: 70   Temp: 97.1 °F (36.2 °C)   TempSrc: Temporal   SpO2: 95%   Weight: 81.5 kg (179 lb 9.6 oz)     Body mass index is 32.33 kg/m².      Physical Exam  Vitals reviewed.   Constitutional:       Appearance: Normal appearance. She is well-developed.   HENT:      Mouth/Throat:      Pharynx: No oropharyngeal exudate.   Cardiovascular:      Rate and Rhythm: Normal rate and regular rhythm.      Heart sounds: Normal heart sounds. No murmur heard.  Pulmonary:      Effort: Pulmonary effort is normal.      Breath sounds: Normal breath sounds.   Musculoskeletal:      Comments: Bump over the left ring finger, palmar surface lateral edge. Non-tender.    Neurological:      Mental Status: She is alert and oriented to person, place, and time.      Cranial Nerves: No cranial nerve deficit.      Motor: No weakness.   Psychiatric:         Mood and Affect: Mood and affect normal.           Result Review :                           Assessment and Plan      Diagnoses and all orders for this visit:    1. Primary hypertension (Primary)  Comments:  Chronic, improving. Continue current regimen.    2. Thyroid nodule  Comments:  Awaiting result of thyroid biopsy.    3. Allergic rhinitis, unspecified seasonality, unspecified trigger  Comments:  Chronic, stable.    4. Finger lesion  Comments:  subacute to chronic, warty lesion vs callous? monitorig clinically w/ plan to re-evaluate at f/u.    5. Medication refill  -     omeprazole (priLOSEC) 20 MG capsule; Take 1 capsule by mouth Every Other Day.  Dispense: 90 capsule; Refill: 1           New Medications Ordered This Visit   Medications    omeprazole (priLOSEC) 20 MG  capsule     Sig: Take 1 capsule by mouth Every Other Day.     Dispense:  90 capsule     Refill:  1                     Follow Up     Return in about 6 weeks (around 6/19/2024) for Hypertension .    Patient was given instructions and counseling regarding her condition or for health maintenance advice. Please see specific information pulled into the AVS if appropriate.     Candelaria Owens MD   Internal Medicine-Pediatrics

## 2024-06-12 DIAGNOSIS — I10 PRIMARY HYPERTENSION: ICD-10-CM

## 2024-06-12 RX ORDER — LOSARTAN POTASSIUM 25 MG/1
12.5 TABLET ORAL DAILY
Qty: 30 TABLET | Refills: 1 | Status: SHIPPED | OUTPATIENT
Start: 2024-06-12

## 2024-06-19 DIAGNOSIS — I10 PRIMARY HYPERTENSION: ICD-10-CM

## 2024-06-19 RX ORDER — LOSARTAN POTASSIUM 25 MG/1
25 TABLET ORAL DAILY
Qty: 30 TABLET | Refills: 1 | Status: SHIPPED | OUTPATIENT
Start: 2024-06-19

## 2024-06-19 NOTE — TELEPHONE ENCOUNTER
Caller: Sandy Boothe    Relationship to patient: Self    Best call back number: [phone]    Patient is needing: PATIENT STATES HER MEDICATION FOR LOSARTAN WAS SUPPOSED TO BE CHANGED TO A FULL TABLET DAILY INSTEAD OF A HALF A TABLET. PATIENT STATES SHE HAS BEEN WAITING ON THE NEW PRESCRIPTION TO BE SENT TO [place] [id number] FOR TWO DAYS AND HAS BEEN COMPLETELY OUT.

## 2024-06-19 NOTE — TELEPHONE ENCOUNTER
Left patient a detailed message regarding medications being sent to pharmacy. Left call back number if any questions arise.

## 2024-06-26 ENCOUNTER — OFFICE VISIT (OUTPATIENT)
Dept: INTERNAL MEDICINE | Facility: CLINIC | Age: 64
End: 2024-06-26
Payer: MEDICARE

## 2024-06-26 VITALS
BODY MASS INDEX: 31.93 KG/M2 | WEIGHT: 177.4 LBS | OXYGEN SATURATION: 95 % | RESPIRATION RATE: 18 BRPM | DIASTOLIC BLOOD PRESSURE: 82 MMHG | TEMPERATURE: 96.5 F | HEART RATE: 91 BPM | SYSTOLIC BLOOD PRESSURE: 138 MMHG

## 2024-06-26 DIAGNOSIS — I10 PRIMARY HYPERTENSION: Primary | ICD-10-CM

## 2024-06-26 DIAGNOSIS — Z13.1 SCREENING FOR DIABETES MELLITUS: ICD-10-CM

## 2024-06-26 LAB
ALBUMIN SERPL-MCNC: 4.1 G/DL (ref 3.5–5.2)
ALBUMIN/GLOB SERPL: 1.5 G/DL
ALP SERPL-CCNC: 100 U/L (ref 39–117)
ALT SERPL W P-5'-P-CCNC: 17 U/L (ref 1–33)
ANION GAP SERPL CALCULATED.3IONS-SCNC: 9.6 MMOL/L (ref 5–15)
AST SERPL-CCNC: 18 U/L (ref 1–32)
BASOPHILS # BLD AUTO: 0.04 10*3/MM3 (ref 0–0.2)
BASOPHILS NFR BLD AUTO: 0.7 % (ref 0–1.5)
BILIRUB SERPL-MCNC: 0.4 MG/DL (ref 0–1.2)
BUN SERPL-MCNC: 13 MG/DL (ref 8–23)
BUN/CREAT SERPL: 12.6 (ref 7–25)
CALCIUM SPEC-SCNC: 9.8 MG/DL (ref 8.6–10.5)
CHLORIDE SERPL-SCNC: 106 MMOL/L (ref 98–107)
CHOLEST SERPL-MCNC: 209 MG/DL (ref 0–200)
CO2 SERPL-SCNC: 25.4 MMOL/L (ref 22–29)
CREAT SERPL-MCNC: 1.03 MG/DL (ref 0.57–1)
DEPRECATED RDW RBC AUTO: 43.8 FL (ref 37–54)
EGFRCR SERPLBLD CKD-EPI 2021: 60.5 ML/MIN/1.73
EOSINOPHIL # BLD AUTO: 0.06 10*3/MM3 (ref 0–0.4)
EOSINOPHIL NFR BLD AUTO: 1 % (ref 0.3–6.2)
ERYTHROCYTE [DISTWIDTH] IN BLOOD BY AUTOMATED COUNT: 12.8 % (ref 12.3–15.4)
GLOBULIN UR ELPH-MCNC: 2.7 GM/DL
GLUCOSE SERPL-MCNC: 95 MG/DL (ref 65–99)
HBA1C MFR BLD: 5.5 % (ref 4.8–5.6)
HCT VFR BLD AUTO: 45 % (ref 34–46.6)
HDLC SERPL-MCNC: 60 MG/DL (ref 40–60)
HGB BLD-MCNC: 15.2 G/DL (ref 12–15.9)
IMM GRANULOCYTES # BLD AUTO: 0.03 10*3/MM3 (ref 0–0.05)
IMM GRANULOCYTES NFR BLD AUTO: 0.5 % (ref 0–0.5)
LDLC SERPL CALC-MCNC: 128 MG/DL (ref 0–100)
LDLC/HDLC SERPL: 2.09 {RATIO}
LYMPHOCYTES # BLD AUTO: 1.86 10*3/MM3 (ref 0.7–3.1)
LYMPHOCYTES NFR BLD AUTO: 30.3 % (ref 19.6–45.3)
MCH RBC QN AUTO: 31.3 PG (ref 26.6–33)
MCHC RBC AUTO-ENTMCNC: 33.8 G/DL (ref 31.5–35.7)
MCV RBC AUTO: 92.6 FL (ref 79–97)
MONOCYTES # BLD AUTO: 0.37 10*3/MM3 (ref 0.1–0.9)
MONOCYTES NFR BLD AUTO: 6 % (ref 5–12)
NEUTROPHILS NFR BLD AUTO: 3.77 10*3/MM3 (ref 1.7–7)
NEUTROPHILS NFR BLD AUTO: 61.5 % (ref 42.7–76)
NRBC BLD AUTO-RTO: 0 /100 WBC (ref 0–0.2)
PLATELET # BLD AUTO: 192 10*3/MM3 (ref 140–450)
PMV BLD AUTO: 10.3 FL (ref 6–12)
POTASSIUM SERPL-SCNC: 4.8 MMOL/L (ref 3.5–5.2)
PROT SERPL-MCNC: 6.8 G/DL (ref 6–8.5)
RBC # BLD AUTO: 4.86 10*6/MM3 (ref 3.77–5.28)
SODIUM SERPL-SCNC: 141 MMOL/L (ref 136–145)
T4 FREE SERPL-MCNC: 1.15 NG/DL (ref 0.92–1.68)
TRIGL SERPL-MCNC: 118 MG/DL (ref 0–150)
TSH SERPL DL<=0.05 MIU/L-ACNC: 3.53 UIU/ML (ref 0.27–4.2)
VLDLC SERPL-MCNC: 21 MG/DL (ref 5–40)
WBC NRBC COR # BLD AUTO: 6.13 10*3/MM3 (ref 3.4–10.8)

## 2024-06-26 PROCEDURE — 85025 COMPLETE CBC W/AUTO DIFF WBC: CPT | Performed by: STUDENT IN AN ORGANIZED HEALTH CARE EDUCATION/TRAINING PROGRAM

## 2024-06-26 PROCEDURE — 80061 LIPID PANEL: CPT | Performed by: STUDENT IN AN ORGANIZED HEALTH CARE EDUCATION/TRAINING PROGRAM

## 2024-06-26 PROCEDURE — 36415 COLL VENOUS BLD VENIPUNCTURE: CPT | Performed by: STUDENT IN AN ORGANIZED HEALTH CARE EDUCATION/TRAINING PROGRAM

## 2024-06-26 PROCEDURE — 84443 ASSAY THYROID STIM HORMONE: CPT | Performed by: STUDENT IN AN ORGANIZED HEALTH CARE EDUCATION/TRAINING PROGRAM

## 2024-06-26 PROCEDURE — 84439 ASSAY OF FREE THYROXINE: CPT | Performed by: STUDENT IN AN ORGANIZED HEALTH CARE EDUCATION/TRAINING PROGRAM

## 2024-06-26 PROCEDURE — 83036 HEMOGLOBIN GLYCOSYLATED A1C: CPT | Performed by: STUDENT IN AN ORGANIZED HEALTH CARE EDUCATION/TRAINING PROGRAM

## 2024-06-26 PROCEDURE — 99214 OFFICE O/P EST MOD 30 MIN: CPT | Performed by: STUDENT IN AN ORGANIZED HEALTH CARE EDUCATION/TRAINING PROGRAM

## 2024-06-26 PROCEDURE — 80053 COMPREHEN METABOLIC PANEL: CPT | Performed by: STUDENT IN AN ORGANIZED HEALTH CARE EDUCATION/TRAINING PROGRAM

## 2024-06-26 PROCEDURE — 1126F AMNT PAIN NOTED NONE PRSNT: CPT | Performed by: STUDENT IN AN ORGANIZED HEALTH CARE EDUCATION/TRAINING PROGRAM

## 2024-06-26 NOTE — PROGRESS NOTES
Chief Complaint  Hypertension (Follow up)    Subjective            Sandy Boothe presents to [facility]    Answers submitted by the patient for this visit:  Primary Reason for Visit (Submitted on 6/21/2024)  What is the primary reason for your visit?: High Blood Pressure    Hypertension :   Chronic, presenting for f/u.   Last seen in May. Losartan was increased to 25mg  at last visit.   Pt has log for review today.   States the numbers are looking better.   Log shows sbp ranging from 108-140/73-85.       Past Medical History:   Diagnosis Date    Allergic     Asthma     Colon polyp     GERD (gastroesophageal reflux disease)     History of medical problems degenerative disc disease    Hypertension     Hyperthyroidism     Thyroid disease        Allergies:   No Known Allergies       Past Surgical History:   Procedure Laterality Date    COLONOSCOPY      COLONOSCOPY N/A 8/24/2022    Procedure: COLONOSCOPY WITH POLYPECTOMY;  Surgeon: Hosea Rose MD;  Location: [facility] ENDOSCOPY;  Service: Gastroenterology;  Laterality: N/A;  COLON POLYP, DIVERTICULOSIS          Social History     Socioeconomic History    Marital status:    Tobacco Use    Smoking status: Never    Smokeless tobacco: Never    Tobacco comments:     tried it as a teenager   Vaping Use    Vaping status: Never Used   Substance and Sexual Activity    Alcohol use: Not Currently     Alcohol/week: 2.0 standard drinks of alcohol     Types: 2 Drinks containing 0.5 oz of alcohol per week     Comment: social drinker    Drug use: Never    Sexual activity: Not Currently     Birth control/protection: Post-menopausal         Family History   Problem Relation Age of Onset    Heart attack Father     Pancreatic cancer Father     Cancer Father         pancreatic    Heart disease Father     Breast cancer Sister     Breast cancer Maternal Grandmother     Cancer Sister         breast    Other Mother          parkinson's    Ovarian cancer Neg Hx     Uterine cancer Neg Hx     Colon cancer Neg Hx     Melanoma Neg Hx     Prostate cancer Neg Hx     Deep vein thrombosis Neg Hx           Health Maintenance Due   Topic Date Due    BMI FOLLOWUP  Never done    RSV Vaccine - Adults (1 - 1-dose 60+ series) Never done    ANNUAL PHYSICAL  01/18/2024    COVID-19 Vaccine (6 - 2023-24 season) 05/16/2024    Pneumococcal Vaccine 65+ (1 of 1 - PCV) Never done            Current Outpatient Medications:     albuterol sulfate  (90 Base) MCG/ACT inhaler, Inhale 2 puffs Every 4 (Four) Hours As Needed for Wheezing or Shortness of Air., Disp: , Rfl:     levocetirizine (XYZAL) 5 MG tablet, Take 1 tablet by mouth Daily., Disp: , Rfl:     losartan (Cozaar) 25 MG tablet, Take 1 tablet by mouth Daily., Disp: 30 tablet, Rfl: 1    methIMAzole (TAPAZOLE) 5 MG tablet, Take 1 tablet by mouth Daily., Disp: , Rfl:     omeprazole (priLOSEC) 20 MG capsule, Take 1 capsule by mouth Every Other Day., Disp: 90 capsule, Rfl: 1    triamcinolone (KENALOG) 0.1 % ointment, Apply 1 application  topically to the appropriate area as directed 2 (Two) Times a Day., Disp: 30 g, Rfl: 0    Auvi-Q 0.3 MG/0.3ML solution auto-injector injection, Inject 0.3 mL into the appropriate muscle as directed by prescriber 1 (One) Time. (Patient not taking: Reported on 6/26/2024), Disp: , Rfl:     montelukast (Singulair) 10 MG tablet, Take 1 tablet by mouth Every Night for 30 days., Disp: 30 tablet, Rfl: 2      Immunization History   Administered Date(s) Administered    COVID-19 (PFIZER) BIVALENT 12+YRS 10/14/2022    COVID-19 (PFIZER) Purple Cap Monovalent 04/08/2021, 04/29/2021, 11/24/2021    COVID-19 F23 (PFIZER) 12YRS+ (COMIRNATY) 01/16/2024    Fluzone (or Fluarix & Flulaval for VFC) >6mos 09/15/2023    Fluzone Quad >6mos (Multi-dose) 10/27/2021    Influenza Injectable Mdck Pf Quad 10/14/2022    Influenza Quad Vaccine (Inpatient) 10/14/2022    Influenza, Unspecified 10/14/2022     [place] 10/27/2021         Review of Systems       Objective       Vitals:    06/26/24 1017   BP: 138/82   BP Location: Right arm   Patient Position: Sitting   Cuff Size: Adult   Pulse: 91   Resp: 18   Temp: 96.5 °F (35.8 °C)   TempSrc: Temporal   SpO2: 95%   Weight: 80.5 kg (177 lb 6.4 oz)     Body mass index is 31.93 kg/m².      Physical Exam  Vitals reviewed.   Constitutional:       Appearance: Normal appearance.   HENT:      Head: Normocephalic and atraumatic.      Nose: Nose normal.   Eyes:      Extraocular Movements: Extraocular movements intact.      Conjunctiva/sclera: Conjunctivae normal.   Cardiovascular:      Rate and Rhythm: Normal rate and regular rhythm.   Pulmonary:      Effort: Pulmonary effort is normal. No respiratory distress.      Breath sounds: Normal breath sounds.   Musculoskeletal:         General: Normal range of motion.   Skin:     General: Skin is warm and dry.   Neurological:      General: No focal deficit present.      Mental Status: She is alert and oriented to person, place, and time.      Cranial Nerves: No cranial nerve deficit.   Psychiatric:         Mood and Affect: Mood normal.         Behavior: Behavior normal.         Thought Content: Thought content normal.             Result Review :                           Assessment and Plan      Diagnoses and all orders for this visit:    1. Primary hypertension (Primary)  Comments:  Chronic, improved since losartan was increased to 25mg. COntinue current regimen.  Orders:  -     CBC & Differential  -     Comprehensive Metabolic Panel  -     Lipid Panel  -     TSH  -     T4, Free    2. Screening for diabetes mellitus  Comments:  Due for screening.  Orders:  -     Hemoglobin A1c          Orders Placed This Encounter   Procedures    Comprehensive Metabolic Panel    Hemoglobin A1c    Lipid Panel    TSH    T4, Free    CBC Auto Differential    CBC & Differential                        Follow Up     Return in about 6 weeks (around 8/7/2024)  for HTN.    Patient was given instructions and counseling regarding her condition or for health maintenance advice. Please see specific information pulled into the AVS if appropriate.     Candelaria Owens MD   Internal Medicine-Pediatrics

## 2024-09-13 ENCOUNTER — OFFICE VISIT (OUTPATIENT)
Dept: INTERNAL MEDICINE | Facility: CLINIC | Age: 64
End: 2024-09-13
Payer: COMMERCIAL

## 2024-09-13 VITALS
RESPIRATION RATE: 16 BRPM | HEIGHT: 63 IN | HEART RATE: 71 BPM | WEIGHT: 177 LBS | DIASTOLIC BLOOD PRESSURE: 90 MMHG | SYSTOLIC BLOOD PRESSURE: 150 MMHG | TEMPERATURE: 96.6 F | OXYGEN SATURATION: 97 % | BODY MASS INDEX: 31.36 KG/M2

## 2024-09-13 DIAGNOSIS — Z23 NEED FOR INFLUENZA VACCINATION: ICD-10-CM

## 2024-09-13 DIAGNOSIS — S90.461S: ICD-10-CM

## 2024-09-13 DIAGNOSIS — Z23 NEED FOR COVID-19 VACCINE: ICD-10-CM
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
## 2024-09-13 DIAGNOSIS — I10 PRIMARY HYPERTENSION: Primary | ICD-10-CM

## 2024-09-13 PROCEDURE — 90480 ADMN SARSCOV2 VAC 1/ONLY CMP: CPT | Performed by: STUDENT IN AN ORGANIZED HEALTH CARE EDUCATION/TRAINING PROGRAM

## 2024-09-13 PROCEDURE — 91320 SARSCV2 VAC 30MCG TRS-SUC IM: CPT | Performed by: STUDENT IN AN ORGANIZED HEALTH CARE EDUCATION/TRAINING PROGRAM

## 2024-09-13 PROCEDURE — 90662 IIV NO PRSV INCREASED AG IM: CPT | Performed by: STUDENT IN AN ORGANIZED HEALTH CARE EDUCATION/TRAINING PROGRAM

## 2024-09-13 PROCEDURE — 99214 OFFICE O/P EST MOD 30 MIN: CPT | Performed by: STUDENT IN AN ORGANIZED HEALTH CARE EDUCATION/TRAINING PROGRAM

## 2024-09-13 PROCEDURE — 90471 IMMUNIZATION ADMIN: CPT | Performed by: STUDENT IN AN ORGANIZED HEALTH CARE EDUCATION/TRAINING PROGRAM

## 2024-09-13 RX ORDER — LOSARTAN POTASSIUM 50 MG/1
50 TABLET ORAL DAILY
Qty: 30 TABLET | Refills: 1 | Status: SHIPPED | OUTPATIENT
Start: 2024-09-13

## 2024-09-13 NOTE — PROGRESS NOTES
Chief Complaint  Hypertension (Follow up ) and Tick Removal (Right foot, had tick bite on 8/22, wants spot looked at to make sure it is okay. )    Subjective            Sandy Boothe presents to [facility]  History of Present Illness    Tick bite between big and 2nd toe on the right foot.   Had friend take tick off.   Has residual scab.     Hypertension :   Chronic, presenting for f/u. BP remains elevated.       Past Medical History:   Diagnosis Date    Allergic     Asthma     Colon polyp     GERD (gastroesophageal reflux disease)     History of medical problems degenerative disc disease    Hypertension     Hyperthyroidism     Thyroid disease        Allergies:   No Known Allergies       Past Surgical History:   Procedure Laterality Date    COLONOSCOPY      COLONOSCOPY N/A 8/24/2022    Procedure: COLONOSCOPY WITH POLYPECTOMY;  Surgeon: Hosea Rose MD;  Location: [facility] ENDOSCOPY;  Service: Gastroenterology;  Laterality: N/A;  COLON POLYP, DIVERTICULOSIS          Social History     Socioeconomic History    Marital status:    Tobacco Use    Smoking status: Never    Smokeless tobacco: Never    Tobacco comments:     tried it as a teenager   Vaping Use    Vaping status: Never Used   Substance and Sexual Activity    Alcohol use: Not Currently     Alcohol/week: 2.0 standard drinks of alcohol     Types: 2 Drinks containing 0.5 oz of alcohol per week     Comment: social drinker    Drug use: Never    Sexual activity: Not Currently     Birth control/protection: Post-menopausal         Family History   Problem Relation Age of Onset    Heart attack Father     Pancreatic cancer Father     Cancer Father         pancreatic    Heart disease Father     Breast cancer Sister     Breast cancer Maternal Grandmother     Cancer Sister         breast    Other Mother         parkinson's    Ovarian cancer Neg Hx     Uterine cancer Neg Hx     Colon cancer Neg Hx      Melanoma Neg Hx     Prostate cancer Neg Hx     Deep vein thrombosis Neg Hx           Health Maintenance Due   Topic Date Due    BMI FOLLOWUP  Never done    ANNUAL PHYSICAL  01/18/2024    Pneumococcal Vaccine 65+ (1 of 1 - PCV) Never done            Current Outpatient Medications:     albuterol sulfate  (90 Base) MCG/ACT inhaler, Inhale 2 puffs Every 4 (Four) Hours As Needed for Wheezing or Shortness of Air., Disp: , Rfl:     Auvi-Q 0.3 MG/0.3ML solution auto-injector injection, Inject 0.3 mL into the appropriate muscle as directed by prescriber As Needed., Disp: , Rfl:     levocetirizine (XYZAL) 5 MG tablet, Take 1 tablet by mouth Daily., Disp: , Rfl:     losartan (Cozaar) 50 MG tablet, Take 1 tablet by mouth Daily., Disp: 30 tablet, Rfl: 1    methIMAzole (TAPAZOLE) 5 MG tablet, Take 1 tablet by mouth Daily., Disp: , Rfl:     omeprazole (priLOSEC) 20 MG capsule, Take 1 capsule by mouth Every Other Day., Disp: 90 capsule, Rfl: 1    montelukast (Singulair) 10 MG tablet, Take 1 tablet by mouth Every Night for 30 days., Disp: 30 tablet, Rfl: 2    triamcinolone (KENALOG) 0.1 % ointment, Apply 1 application  topically to the appropriate area as directed 2 (Two) Times a Day. (Patient not taking: Reported on 9/13/2024), Disp: 30 g, Rfl: 0      Immunization History   Administered Date(s) Administered    COVID-19 (PFIZER) 12YRS+ (COMIRNATY) 01/16/2024, 09/13/2024    COVID-19 (PFIZER) BIVALENT 12+YRS 10/14/2022    COVID-19 (PFIZER) Purple Cap Monovalent 04/08/2021, 04/29/2021, 11/24/2021    Fluzone  >6mos 10/14/2022    Fluzone (or Fluarix & Flulaval for VFC) >6mos 09/15/2023    Fluzone High-Dose 65+YRS 09/13/2024    Fluzone Quad >6mos (Multi-dose) 10/27/2021    Influenza Injectable Mdck Pf Quad 10/14/2022    Influenza, Unspecified 10/14/2022    Shingrix 10/27/2021         Review of Systems       Objective       Vitals:    09/13/24 1135   BP: 150/90   BP Location: Right arm   Patient Position: Sitting   Cuff Size: Adult  "  Pulse: 71   Resp: 16   Temp: 96.6 °F (35.9 °C)   TempSrc: Temporal   SpO2: 97%   Weight: 80.3 kg (177 lb)   Height: 158.8 cm (62.52\")     BMI is >= 30 and <35. (Class 1 Obesity). The following options were offered after discussion;: exercise counseling/recommendations         Physical Exam  Vitals reviewed.   Constitutional:       Appearance: Normal appearance.   HENT:      Head: Normocephalic and atraumatic.      Nose: Nose normal.   Eyes:      Extraocular Movements: Extraocular movements intact.      Conjunctiva/sclera: Conjunctivae normal.   Cardiovascular:      Rate and Rhythm: Normal rate and regular rhythm.      Pulses: Normal pulses.      Heart sounds: Normal heart sounds.   Pulmonary:      Effort: Pulmonary effort is normal. No respiratory distress.      Breath sounds: Normal breath sounds.   Musculoskeletal:         General: Normal range of motion.   Skin:     General: Skin is warm and dry.      Comments: Small scab noted between 1st and 2nd toe of right foot. No tick. No erythema, warmth or tenderness.   Neurological:      General: No focal deficit present.      Mental Status: She is alert and oriented to person, place, and time.      Cranial Nerves: No cranial nerve deficit.   Psychiatric:         Mood and Affect: Mood normal.         Behavior: Behavior normal.         Thought Content: Thought content normal.             Result Review :                           Assessment and Plan      Diagnoses and all orders for this visit:    1. Primary hypertension (Primary)  Comments:  chronic, not controlled, increasing losartan to 50mg w/ plan for close f/u in 4-6wks.  Orders:  -     losartan (Cozaar) 50 MG tablet; Take 1 tablet by mouth Daily.  Dispense: 30 tablet; Refill: 1    2. Tick bite of right great toe, sequela  Comments:  no tick noted on exam. recommend pt calls if she sustains another tick bite for ppx.    3. Need for COVID-19 vaccine  -     COVID-19 F23 (Pfizer) 12yrs+ (COMIRNATY)    4. Need for " influenza vaccination  -     Fluzone High-Dose 65+yrs (8254-9456)        Assessment & Plan  Primary hypertension    Tick bite of right great toe, sequela    Need for COVID-19 vaccine    Need for influenza vaccination      Orders Placed This Encounter   Procedures    COVID-19 F23 (Pfizer) 12yrs+ (COMIRNATY)    Fluzone High-Dose 65+yrs ([id number])     New Medications Ordered This Visit   Medications    losartan (Cozaar) 50 MG tablet     Sig: Take 1 tablet by mouth Daily.     Dispense:  30 tablet     Refill:  1                     Follow Up     Return in about 6 weeks (around 10/25/2024) for HTN.    Patient was given instructions and counseling regarding her condition or for health maintenance advice. Please see specific information pulled into the AVS if appropriate.     Candelaria Owens MD   Internal Medicine-Pediatrics

## 2024-10-31 ENCOUNTER — OFFICE VISIT (OUTPATIENT)
Dept: INTERNAL MEDICINE | Facility: CLINIC | Age: 64
End: 2024-10-31
Payer: COMMERCIAL

## 2024-10-31 VITALS
HEART RATE: 75 BPM | TEMPERATURE: 97 F | DIASTOLIC BLOOD PRESSURE: 86 MMHG | WEIGHT: 176 LBS | OXYGEN SATURATION: 97 % | SYSTOLIC BLOOD PRESSURE: 132 MMHG | BODY MASS INDEX: 31.66 KG/M2

## 2024-10-31 DIAGNOSIS — E04.1 THYROID NODULE: ICD-10-CM

## 2024-10-31 DIAGNOSIS — I10 PRIMARY HYPERTENSION: Primary | ICD-10-CM

## 2024-10-31 DIAGNOSIS — Z23 NEED FOR VACCINATION: ICD-10-CM

## 2024-10-31 NOTE — LETTER
[facility]  Vaccine Consent Form    Patient Name:  Sandy Boothe  Patient :  1960     Vaccine(s) Ordered    Pneumococcal Conjugate Vaccine 20-Valent All        Screening Checklist  The following questions should be completed prior to vaccination. If you answer “yes” to any question, it does not necessarily mean you should not be vaccinated. It just means we may need to clarify or ask more questions. If a question is unclear, please ask your healthcare provider to explain it.    Yes No   Any fever or moderate to severe illness today (mild illness and/or antibiotic treatment are not contraindications)?     Do you have a history of a serious reaction to any previous vaccinations, such as anaphylaxis, encephalopathy within 7 days, Guillain-[place] syndrome within 6 weeks, seizure?     Have you received any live vaccine(s) (e.g MMR, BRIAN) or any other vaccines in the last month (to ensure duplicate doses aren't given)?     Do you have an anaphylactic allergy to latex (DTaP, DTaP-IPV, Hep A, Hep B, MenB, RV, Td, Tdap), baker’s yeast (Hep B, HPV), polysorbates (RSV, nirsevimab, PCV 20, Rotavirrus, Tdap, Shingrix), or gelatin (BRIAN, MMR)?     Do you have an anaphylactic allergy to neomycin (Rabies, BRIAN, MMR, IPV, Hep A), polymyxin B (IPV), or streptomycin (IPV)?      Any cancer, leukemia, AIDS, or other immune system disorder? (BRIAN, MMR, RV)     Do you have a parent, brother, or sister with an immune system problem (if immune competence of vaccine recipient clinically verified, can proceed)? (MMR, BRIAN)     Any recent steroid treatments for >2 weeks, chemotherapy, or radiation treatment? (BRIAN, MMR)     Have you received antibody-containing blood transfusions or IVIG in the past 11 months (recommended interval is dependent on product)? (MMR, BRIAN)     Have you taken antiviral drugs (acyclovir, famciclovir, valacyclovir for BRIAN) in the last 24 or 48 hours, respectively?      Are you pregnant or planning to become  "pregnant within 1 month? (BRIAN, MMR, HPV, IPV, MenB, Abrexvy; For Hep B- refer to Engerix-B; For RSV - Abrysvo is indicated for 32-36 weeks of pregnancy from September to January)     For infants, have you ever been told your child has had intussusception or a medical emergency involving obstruction of the intestine (Rotavirus)? If not for an infant, can skip this question.         *Ordering Physicians/APC should be consulted if \"yes\" is checked by the patient or guardian above.  I have received, read, and understand the Vaccine Information Statement (VIS) for each vaccine ordered.  I have considered my or my child's health status as well as the health status of my close contacts.  I have taken the opportunity to discuss my vaccine questions with my or my child's health care provider.   I have requested that the ordered vaccine(s) be given to me or my child.  I understand the benefits and risks of the vaccines.  I understand that I should remain in the clinic for 15 minutes after receiving the vaccine(s).  _________________________________________________________  Signature of Patient or Parent/Legal Guardian ____________________  Date     "

## 2024-10-31 NOTE — PROGRESS NOTES
Chief Complaint  Hypertension (Follow up )    Subjective            Sandy Boothe presents to [facility]  History of Present Illness  Answers submitted by the patient for this visit:  Primary Reason for Visit (Submitted on 10/24/2024)  What is the primary reason for your visit?: High Blood Pressure  Hypertension :   Chronic, presenting for f/u.   Has bp log for review which shows sbp changing 125-144, with most numbers ranging in the 120's. States she recently got a treadmill and has been using it more to exercise lately.     Thyroid nodule:   Chronic, following with endo.  States methimazole dosing schedule was changed to M-F, 1x per day. Labs for thyroid are due next next month.      Past Medical History:   Diagnosis Date    Allergic     Asthma     Colon polyp     GERD (gastroesophageal reflux disease)     History of medical problems degenerative disc disease    Hypertension     Hyperthyroidism     Thyroid disease        Allergies:   No Known Allergies       Past Surgical History:   Procedure Laterality Date    COLONOSCOPY      COLONOSCOPY N/A 8/24/2022    Procedure: COLONOSCOPY WITH POLYPECTOMY;  Surgeon: Hosea Rose MD;  Location: [facility] ENDOSCOPY;  Service: Gastroenterology;  Laterality: N/A;  COLON POLYP, DIVERTICULOSIS          Social History     Socioeconomic History    Marital status:    Tobacco Use    Smoking status: Never    Smokeless tobacco: Never    Tobacco comments:     tried it as a teenager   Vaping Use    Vaping status: Never Used   Substance and Sexual Activity    Alcohol use: Not Currently     Alcohol/week: 2.0 standard drinks of alcohol     Types: 2 Drinks containing 0.5 oz of alcohol per week     Comment: social drinker    Drug use: Never    Sexual activity: Not Currently     Birth control/protection: Post-menopausal         Family History   Problem Relation Age of Onset    Heart attack Father     Pancreatic cancer Father      Cancer Father         pancreatic    Heart disease Father     Breast cancer Sister     Breast cancer Maternal Grandmother     Cancer Sister         breast    Other Mother         parkinson's    Ovarian cancer Neg Hx     Uterine cancer Neg Hx     Colon cancer Neg Hx     Melanoma Neg Hx     Prostate cancer Neg Hx     Deep vein thrombosis Neg Hx           Health Maintenance Due   Topic Date Due    ANNUAL PHYSICAL  01/18/2024            Current Outpatient Medications:     albuterol sulfate  (90 Base) MCG/ACT inhaler, Inhale 2 puffs Every 4 (Four) Hours As Needed for Wheezing or Shortness of Air., Disp: , Rfl:     Auvi-Q 0.3 MG/0.3ML solution auto-injector injection, Inject 0.3 mL into the appropriate muscle as directed by prescriber As Needed., Disp: , Rfl:     levocetirizine (XYZAL) 5 MG tablet, Take 1 tablet by mouth Daily., Disp: , Rfl:     losartan (Cozaar) 50 MG tablet, Take 1 tablet by mouth Daily., Disp: 30 tablet, Rfl: 1    methIMAzole (TAPAZOLE) 5 MG tablet, Take 1 tablet by mouth Daily., Disp: , Rfl:     omeprazole (priLOSEC) 20 MG capsule, Take 1 capsule by mouth Every Other Day., Disp: 90 capsule, Rfl: 1    montelukast (Singulair) 10 MG tablet, Take 1 tablet by mouth Every Night for 30 days., Disp: 30 tablet, Rfl: 2    triamcinolone (KENALOG) 0.1 % ointment, Apply 1 application  topically to the appropriate area as directed 2 (Two) Times a Day. (Patient not taking: Reported on 10/31/2024), Disp: 30 g, Rfl: 0      Immunization History   Administered Date(s) Administered    COVID-19 (PFIZER) 12YRS+ (COMIRNATY) 01/16/2024, 09/13/2024    COVID-19 (PFIZER) BIVALENT 12+YRS 10/14/2022    COVID-19 (PFIZER) Purple Cap Monovalent 04/08/2021, 04/29/2021, 11/24/2021    Fluzone  >6mos 10/14/2022    Fluzone (or Fluarix & Flulaval for VFC) >6mos 09/15/2023    Fluzone High-Dose 65+YRS 09/13/2024    Fluzone Quad >6mos (Multi-dose) 10/27/2021    Influenza Injectable Mdck Pf Quad 10/14/2022    Influenza, Unspecified  10/14/2022    Pneumococcal Conjugate 20-Valent (PCV20) 10/31/2024    Shingrix 10/27/2021     Review of Systems     Objective       Vitals:    10/31/24 1142   BP: 132/86   BP Location: Left arm   Patient Position: Sitting   Cuff Size: Adult   Pulse: 75   Temp: 97 °F (36.1 °C)   TempSrc: Temporal   SpO2: 97%   Weight: 79.8 kg (176 lb)       Physical Exam  Vitals reviewed.   Constitutional:       Appearance: Normal appearance.   HENT:      Head: Normocephalic and atraumatic.      Nose: Nose normal.   Eyes:      Extraocular Movements: Extraocular movements intact.      Conjunctiva/sclera: Conjunctivae normal.   Cardiovascular:      Rate and Rhythm: Normal rate and regular rhythm.      Pulses: Normal pulses.      Heart sounds: Normal heart sounds.   Pulmonary:      Effort: Pulmonary effort is normal. No respiratory distress.      Breath sounds: Normal breath sounds.   Musculoskeletal:         General: Normal range of motion.   Skin:     General: Skin is warm and dry.   Neurological:      General: No focal deficit present.      Mental Status: She is alert and oriented to person, place, and time.      Cranial Nerves: No cranial nerve deficit.   Psychiatric:         Mood and Affect: Mood normal.         Behavior: Behavior normal.         Thought Content: Thought content normal.       Result Review :                           Assessment and Plan      Diagnoses and all orders for this visit:    1. Primary hypertension (Primary)  Comments:  chronic, presenting for f/u. BP is now getting within goal. continue current regimen.    2. Thyroid nodule  Comments:  Chronic, following with endocrinology. Methimazole dosing schedule changed to 1 tablet daily Monday through Friday.    3. Need for vaccination  -     Pneumococcal Conjugate Vaccine 20-Valent All                Follow Up     Return in about 3 months (around 1/31/2025) for HTN.    Patient was given instructions and counseling regarding her condition or for health maintenance  advice. Please see specific information pulled into the AVS if appropriate.     Candelaria Owens MD   Internal Medicine-Pediatrics

## 2024-11-19 DIAGNOSIS — I10 PRIMARY HYPERTENSION: ICD-10-CM

## 2024-11-19 RX ORDER — LOSARTAN POTASSIUM 50 MG/1
50 TABLET ORAL DAILY
Qty: 30 TABLET | Refills: 1 | Status: SHIPPED | OUTPATIENT
Start: 2024-11-19

## 2025-01-14 DIAGNOSIS — I10 PRIMARY HYPERTENSION: ICD-10-CM

## 2025-01-14 RX ORDER — LOSARTAN POTASSIUM 50 MG/1
50 TABLET ORAL DAILY
Qty: 30 TABLET | Refills: 1 | Status: SHIPPED | OUTPATIENT
Start: 2025-01-14

## 2025-01-14 NOTE — TELEPHONE ENCOUNTER
Rx Refill Note  Requested Prescriptions     Pending Prescriptions Disp Refills    losartan (Cozaar) 50 MG tablet 30 tablet 1     Sig: Take 1 tablet by mouth Daily.      Last office visit with prescribing clinician: 10/31/2024   Last telemedicine visit with prescribing clinician: Visit date not found   Next office visit with prescribing clinician: 1/31/2025                         Would you like a call back once the refill request has been completed: [] Yes [] No    If the office needs to give you a call back, can they leave a voicemail: [] Yes [] No    Sara Lau MA  01/14/25, 14:54 EST

## 2025-01-30 ENCOUNTER — TRANSCRIBE ORDERS (OUTPATIENT)
Dept: ADMINISTRATIVE | Facility: HOSPITAL | Age: 65
End: 2025-01-30
Payer: COMMERCIAL

## 2025-01-30 DIAGNOSIS — Z12.31 SCREENING MAMMOGRAM, ENCOUNTER FOR: Primary | ICD-10-CM

## 2025-01-31 ENCOUNTER — OFFICE VISIT (OUTPATIENT)
Dept: INTERNAL MEDICINE | Facility: CLINIC | Age: 65
End: 2025-01-31
Payer: COMMERCIAL

## 2025-01-31 VITALS
SYSTOLIC BLOOD PRESSURE: 142 MMHG | TEMPERATURE: 97.8 F | OXYGEN SATURATION: 98 % | HEIGHT: 63 IN | WEIGHT: 178.25 LBS | HEART RATE: 75 BPM | DIASTOLIC BLOOD PRESSURE: 86 MMHG | BODY MASS INDEX: 31.58 KG/M2 | RESPIRATION RATE: 18 BRPM

## 2025-01-31 DIAGNOSIS — Z78.0 POST-MENOPAUSAL: ICD-10-CM

## 2025-01-31 DIAGNOSIS — I10 PRIMARY HYPERTENSION: Primary | ICD-10-CM

## 2025-01-31 DIAGNOSIS — M85.80 OSTEOPENIA, UNSPECIFIED LOCATION: ICD-10-CM

## 2025-01-31 PROCEDURE — 99214 OFFICE O/P EST MOD 30 MIN: CPT | Performed by: STUDENT IN AN ORGANIZED HEALTH CARE EDUCATION/TRAINING PROGRAM

## 2025-01-31 NOTE — PROGRESS NOTES
Chief Complaint  Follow-up (3 month ) and Hypertension    Subjective            Sandy Boothe presents to [facility]  History of Present Illness      Hypertension:  Chronic, presenting for f/u. SBP ranging from 122-158. High numbers are related to increased stress per pt. She has been under a lot of stress helping her daughter financially.     Thyroid nodule:  Chronic, following w/ endocrinology.   States her next biopsy is scheduled for April.       Past Medical History:   Diagnosis Date    Allergic     Asthma     Colon polyp     GERD (gastroesophageal reflux disease)     History of medical problems degenerative disc disease    Hypertension     Hyperthyroidism     Thyroid disease        Allergies:   No Known Allergies       Past Surgical History:   Procedure Laterality Date    COLONOSCOPY      COLONOSCOPY N/A 8/24/2022    Procedure: COLONOSCOPY WITH POLYPECTOMY;  Surgeon: Hosea Rose MD;  Location: [facility] ENDOSCOPY;  Service: Gastroenterology;  Laterality: N/A;  COLON POLYP, DIVERTICULOSIS          Social History     Socioeconomic History    Marital status:    Tobacco Use    Smoking status: Never    Smokeless tobacco: Never    Tobacco comments:     tried it as a teenager   Vaping Use    Vaping status: Never Used   Substance and Sexual Activity    Alcohol use: Not Currently     Alcohol/week: 2.0 standard drinks of alcohol     Types: 2 Drinks containing 0.5 oz of alcohol per week     Comment: social drinker    Drug use: Never    Sexual activity: Not Currently     Birth control/protection: Post-menopausal         Family History   Problem Relation Age of Onset    Heart attack Father     Pancreatic cancer Father     Cancer Father         pancreatic    Heart disease Father     Breast cancer Sister     Breast cancer Maternal Grandmother     Cancer Sister         breast    Ovarian cancer Neg Hx     Uterine cancer Neg Hx     Colon cancer Neg Hx      "Melanoma Neg Hx     Prostate cancer Neg Hx     Deep vein thrombosis Neg Hx           Health Maintenance Due   Topic Date Due    ANNUAL PHYSICAL  01/18/2024    DXA SCAN  02/06/2025            Current Outpatient Medications:     albuterol sulfate  (90 Base) MCG/ACT inhaler, Inhale 2 puffs Every 4 (Four) Hours As Needed for Wheezing or Shortness of Air., Disp: , Rfl:     Auvi-Q 0.3 MG/0.3ML solution auto-injector injection, Inject 0.3 mL into the appropriate muscle as directed by prescriber As Needed., Disp: , Rfl:     levocetirizine (XYZAL) 5 MG tablet, Take 1 tablet by mouth Daily., Disp: , Rfl:     losartan (Cozaar) 50 MG tablet, Take 1 tablet by mouth Daily., Disp: 30 tablet, Rfl: 1    methIMAzole (TAPAZOLE) 5 MG tablet, Take 1 tablet by mouth Daily., Disp: , Rfl:     montelukast (Singulair) 10 MG tablet, Take 1 tablet by mouth Every Night for 30 days., Disp: 30 tablet, Rfl: 2    omeprazole (priLOSEC) 20 MG capsule, Take 1 capsule by mouth Every Other Day., Disp: 90 capsule, Rfl: 1      Immunization History   Administered Date(s) Administered    COVID-19 (PFIZER) 12YRS+ (COMIRNATY) 01/16/2024, 09/13/2024    COVID-19 (PFIZER) BIVALENT 12+YRS 10/14/2022    COVID-19 (PFIZER) Purple Cap Monovalent 04/08/2021, 04/29/2021, 11/24/2021    Fluzone  >6mos 10/14/2022    Fluzone (or Fluarix & Flulaval for VFC) >6mos 09/15/2023    Fluzone High-Dose 65+YRS 09/13/2024    Fluzone Quad >6mos (Multi-dose) 10/27/2021    Influenza Injectable Mdck Pf Quad 10/14/2022    Influenza, Unspecified 10/14/2022    Pneumococcal Conjugate 20-Valent (PCV20) 10/31/2024    Shingrix 10/27/2021         Review of Systems       Objective       Vitals:    01/31/25 1140   BP: 142/86   BP Location: Left arm   Patient Position: Sitting   Cuff Size: Adult   Pulse: 75   Resp: 18   Temp: 97.8 °F (36.6 °C)   TempSrc: Temporal   SpO2: 98%   Weight: 80.9 kg (178 lb 4 oz)   Height: 158.8 cm (62.52\")               Physical Exam  Vitals reviewed. "   Constitutional:       Appearance: Normal appearance.   HENT:      Head: Normocephalic and atraumatic.      Nose: Nose normal.   Eyes:      Extraocular Movements: Extraocular movements intact.      Conjunctiva/sclera: Conjunctivae normal.   Cardiovascular:      Rate and Rhythm: Normal rate and regular rhythm.      Pulses: Normal pulses.      Heart sounds: Normal heart sounds.   Pulmonary:      Effort: Pulmonary effort is normal. No respiratory distress.      Breath sounds: Normal breath sounds.   Musculoskeletal:         General: Normal range of motion.   Skin:     General: Skin is warm and dry.   Neurological:      General: No focal deficit present.      Mental Status: She is alert and oriented to person, place, and time.      Cranial Nerves: No cranial nerve deficit.   Psychiatric:         Mood and Affect: Mood normal.         Behavior: Behavior normal.         Thought Content: Thought content normal.             Result Review :                           Assessment and Plan      Diagnoses and all orders for this visit:    1. Primary hypertension (Primary)  Comments:  Chronic, stable although w/ interveral worsening due to stress. Recommend counseling to help w/ stress management. pt to continue checking bp at home, she is to call if sbp remains >140 consistently over the next few weeks. Labs ordered which she will have done prior to next visit.   Orders:  -     Comprehensive Metabolic Panel; Future  -     CBC & Differential; Future  -     TSH; Future  -     Lipid Panel; Future  -     T4, Free; Future    2. Osteopenia, unspecified location  Due for screening.   -     DEXA Bone Density Axial    3. Post-menopausal  -     DEXA Bone Density Axial                      Follow Up     Return in about 3 months (around 4/30/2025) for HTN.    Patient was given instructions and counseling regarding her condition or for health maintenance advice. Please see specific information pulled into the AVS if appropriate.     Candelaria  MD Roman   Internal Medicine-Pediatrics

## 2025-02-22 ENCOUNTER — HOSPITAL ENCOUNTER (OUTPATIENT)
Dept: MAMMOGRAPHY | Facility: HOSPITAL | Age: 65
Discharge: HOME | End: 2025-02-22
Admitting: STUDENT IN AN ORGANIZED HEALTH CARE EDUCATION/TRAINING PROGRAM
Payer: COMMERCIAL

## 2025-02-22 DIAGNOSIS — Z12.31 SCREENING MAMMOGRAM, ENCOUNTER FOR: ICD-10-CM

## 2025-02-22 PROCEDURE — 77067 SCR MAMMO BI INCL CAD: CPT

## 2025-02-22 PROCEDURE — 77063 BREAST TOMOSYNTHESIS BI: CPT

## 2025-02-24 ENCOUNTER — HOSPITAL ENCOUNTER (OUTPATIENT)
Dept: BONE DENSITY | Facility: HOSPITAL | Age: 65
Discharge: HOME | End: 2025-02-24
Admitting: STUDENT IN AN ORGANIZED HEALTH CARE EDUCATION/TRAINING PROGRAM
Payer: COMMERCIAL

## 2025-02-24 PROCEDURE — 77080 DXA BONE DENSITY AXIAL: CPT

## 2025-03-13 DIAGNOSIS — I10 PRIMARY HYPERTENSION: ICD-10-CM

## 2025-03-13 RX ORDER — LOSARTAN POTASSIUM 50 MG/1
50 TABLET ORAL DAILY
Qty: 30 TABLET | Refills: 1 | Status: SHIPPED | OUTPATIENT
Start: 2025-03-13

## 2025-05-19 DIAGNOSIS — I10 PRIMARY HYPERTENSION: ICD-10-CM

## 2025-05-19 RX ORDER — LOSARTAN POTASSIUM 50 MG/1
50 TABLET ORAL DAILY
Qty: 30 TABLET | Refills: 1 | Status: SHIPPED | OUTPATIENT
Start: 2025-05-19 | End: 2025-05-23 | Stop reason: SDUPTHER

## 2025-05-23 ENCOUNTER — OFFICE VISIT (OUTPATIENT)
Dept: INTERNAL MEDICINE | Facility: CLINIC | Age: 65
End: 2025-05-23
Payer: COMMERCIAL

## 2025-05-23 VITALS
TEMPERATURE: 97.4 F | DIASTOLIC BLOOD PRESSURE: 90 MMHG | HEART RATE: 78 BPM | SYSTOLIC BLOOD PRESSURE: 136 MMHG | WEIGHT: 178.13 LBS | OXYGEN SATURATION: 98 % | HEIGHT: 63 IN | BODY MASS INDEX: 31.56 KG/M2

## 2025-05-23 DIAGNOSIS — R05.3 CHRONIC COUGH: ICD-10-CM

## 2025-05-23 DIAGNOSIS — E04.1 THYROID NODULE: ICD-10-CM

## 2025-05-23 DIAGNOSIS — J30.9 ALLERGIC RHINITIS, UNSPECIFIED SEASONALITY, UNSPECIFIED TRIGGER: ICD-10-CM

## 2025-05-23 DIAGNOSIS — I10 PRIMARY HYPERTENSION: ICD-10-CM

## 2025-05-23 DIAGNOSIS — Z00.00 ANNUAL PHYSICAL EXAM: Primary | ICD-10-CM

## 2025-05-23 PROCEDURE — 99397 PER PM REEVAL EST PAT 65+ YR: CPT | Performed by: STUDENT IN AN ORGANIZED HEALTH CARE EDUCATION/TRAINING PROGRAM

## 2025-05-23 RX ORDER — PHENOL 1.4 %
600 AEROSOL, SPRAY (ML) MUCOUS MEMBRANE DAILY
COMMUNITY

## 2025-05-23 RX ORDER — AZELASTINE 1 MG/ML
1 SPRAY, METERED NASAL
COMMUNITY

## 2025-05-23 RX ORDER — LOSARTAN POTASSIUM 50 MG/1
50 TABLET ORAL DAILY
Qty: 90 TABLET | Refills: 1 | Status: SHIPPED | OUTPATIENT
Start: 2025-05-23

## 2025-05-23 NOTE — PROGRESS NOTES
Chief Complaint  Annual Exam    Subjective            Sandy Boothe presents to [facility]  History of Present Illness    Annual physical:  Last pap: in Sept 2023, next due in 2026  Last mammogram:  UTD, last mammogram was in Feb 2025 and wnl.. Older sister (torinq left mastectomy, in her 50's) and maternal grandmother w/ breast cancer. Pt states she has a hx of abnormal mammogram due to dense breast.   Last dexa scan: Feb 2025, showed osteopenia   Colonoscopy: last was in August 2022, 5 yr recall    Hypertension :   Chronic presenting for f/u.   Has bp log fro review.   SBP ranging from 107-156 with  most numbers in the 130's.   DBP ranging from  with most numbers in the 80's.    Thyroid nodule:  Chronic, doing well. On methimazole, managed by endocrinology.  Denies any change in skin, hair or nail.     Allergic rhinitis:  Chronic cough:   Following with allergist.   She is getting allergy injections, now on every other week schedule.       Past Medical History:   Diagnosis Date    Allergic     Asthma     Colon polyp     GERD (gastroesophageal reflux disease)     History of medical problems degenerative disc disease    Hypertension     Hyperthyroidism     Thyroid disease        Allergies:   No Known Allergies       Past Surgical History:   Procedure Laterality Date    COLONOSCOPY      COLONOSCOPY N/A 8/24/2022    Procedure: COLONOSCOPY WITH POLYPECTOMY;  Surgeon: Hosea Rose MD;  Location: [facility] ENDOSCOPY;  Service: Gastroenterology;  Laterality: N/A;  COLON POLYP, DIVERTICULOSIS          Social History     Socioeconomic History    Marital status:    Tobacco Use    Smoking status: Never    Smokeless tobacco: Never    Tobacco comments:     tried it as a teenager   Vaping Use    Vaping status: Never Used   Substance and Sexual Activity    Alcohol use: Not Currently     Alcohol/week: 2.0 standard drinks of alcohol     Types: 2 Drinks  Yes absolutely.  Please get verbal agreement to have HIV testing performed which I would recommend and have ordered.   containing 0.5 oz of alcohol per week     Comment: social drinker    Drug use: Never    Sexual activity: Not Currently     Birth control/protection: Post-menopausal         Family History   Problem Relation Age of Onset    Heart attack Father     Pancreatic cancer Father     Cancer Father         pancreatic    Heart disease Father     Breast cancer Sister     Breast cancer Maternal Grandmother     Cancer Sister         breast    Ovarian cancer Neg Hx     Uterine cancer Neg Hx     Colon cancer Neg Hx     Melanoma Neg Hx     Prostate cancer Neg Hx     Deep vein thrombosis Neg Hx           Health Maintenance Due   Topic Date Due    ANNUAL PHYSICAL  01/18/2024    COVID-19 Vaccine (8 - 2024-25 season) 03/13/2025            Current Outpatient Medications:     albuterol sulfate  (90 Base) MCG/ACT inhaler, Inhale 2 puffs Every 4 (Four) Hours As Needed for Wheezing or Shortness of Air., Disp: , Rfl:     Auvi-Q 0.3 MG/0.3ML solution auto-injector injection, Inject 0.3 mL into the appropriate muscle as directed by prescriber As Needed., Disp: , Rfl:     azelastine (ASTELIN) 0.1 % nasal spray, Administer 1 spray into the nostril(s) as directed by provider every night at bedtime. Use in each nostril as directed, Disp: , Rfl:     calcium carbonate (OS-RAUL) 600 MG tablet, Take 1 tablet by mouth Daily., Disp: , Rfl:     levocetirizine (XYZAL) 5 MG tablet, Take 1 tablet by mouth Daily., Disp: , Rfl:     losartan (COZAAR) 50 MG tablet, Take 1 tablet by mouth Daily., Disp: 90 tablet, Rfl: 1    methIMAzole (TAPAZOLE) 5 MG tablet, Take 1 tablet by mouth Daily., Disp: , Rfl:     omeprazole (priLOSEC) 20 MG capsule, Take 1 capsule by mouth Every Other Day., Disp: 90 capsule, Rfl: 1    montelukast (Singulair) 10 MG tablet, Take 1 tablet by mouth Every Night for 30 days., Disp: 30 tablet, Rfl: 2      Immunization History   Administered Date(s) Administered    COVID-19 (PFIZER) 12YRS+ (COMIRNATY) 01/16/2024, 09/13/2024    COVID-19  "(PFIZER) BIVALENT 12+YRS 10/14/2022    COVID-19 (PFIZER) Purple Cap Monovalent 04/08/2021, 04/29/2021, 11/24/2021, 10/14/2022    Flu Vaccine Quad PF 6-35MO 02/07/2018, 09/13/2019    Fluzone  >6mos 10/14/2022    Fluzone (or Fluarix & Flulaval for VFC) >6mos 09/15/2023    Fluzone High-Dose 65+YRS 09/13/2024    Fluzone Quad >6mos (Multi-dose) 10/27/2021    Influenza Injectable Mdck Pf Quad 09/14/2020, 10/14/2022    Influenza, Unspecified 10/14/2022    Pneumococcal Conjugate 20-Valent (PCV20) 10/31/2024    Shingrix 07/19/2021, 10/27/2021    Tdap 07/19/2021         Review of Systems       Objective       Vitals:    05/23/25 1301   BP: 136/90   BP Location: Left arm   Patient Position: Sitting   Pulse: 78   Temp: 97.4 °F (36.3 °C)   TempSrc: Temporal   SpO2: 98%   Weight: 80.8 kg (178 lb 2 oz)   Height: 158.8 cm (62.52\")               Physical Exam  Vitals reviewed.   Constitutional:       Appearance: Normal appearance.   HENT:      Head: Normocephalic and atraumatic.      Nose: Nose normal.   Eyes:      Extraocular Movements: Extraocular movements intact.      Conjunctiva/sclera: Conjunctivae normal.   Cardiovascular:      Rate and Rhythm: Normal rate and regular rhythm.      Pulses: Normal pulses.      Heart sounds: Normal heart sounds.   Pulmonary:      Effort: Pulmonary effort is normal. No respiratory distress.      Breath sounds: Normal breath sounds.   Musculoskeletal:         General: Normal range of motion.   Skin:     General: Skin is warm and dry.   Neurological:      General: No focal deficit present.      Mental Status: She is alert and oriented to person, place, and time.      Cranial Nerves: No cranial nerve deficit.   Psychiatric:         Mood and Affect: Mood normal.         Behavior: Behavior normal.         Thought Content: Thought content normal.             Result Review :                           Assessment and Plan        Assessment & Plan  Annual physical exam  At baseline state of health. Other " acute and chronic needs also addressed below.       Primary hypertension  Chronic, stable.    Orders:    losartan (COZAAR) 50 MG tablet; Take 1 tablet by mouth Daily.    Thyroid nodule  Chronic, following w/ endocrinology.       Allergic rhinitis, unspecified seasonality, unspecified trigger  Chronic, stable. Following w/ allergist.        Chronic cough  Subacute to chronic, likely related to PND from allergies.  Dicussed PFT for further eval, however pt is having spirometry done at allergists.   Discussed plan to obtain CXR if persisting at f/u.          Preventive counseling about the importance of daily exercise, healthy eating and good sleep hygiene for cardiovascular and mental health discussed.              Follow Up     No follow-ups on file.    Patient was given instructions and counseling regarding her condition or for health maintenance advice. Please see specific information pulled into the AVS if appropriate.     Candelaria Owens MD   Internal Medicine-Pediatrics

## 2025-05-29 ENCOUNTER — CLINICAL SUPPORT (OUTPATIENT)
Dept: INTERNAL MEDICINE | Facility: CLINIC | Age: 65
End: 2025-05-29
Payer: COMMERCIAL

## 2025-05-29 DIAGNOSIS — I10 PRIMARY HYPERTENSION: ICD-10-CM

## 2025-05-29 DIAGNOSIS — E05.90 HYPERTHYROIDISM: Primary | ICD-10-CM

## 2025-05-29 DIAGNOSIS — D36.9 TUBULAR ADENOMA: ICD-10-CM

## 2025-05-29 LAB
ALBUMIN SERPL-MCNC: 4.1 G/DL (ref 3.5–5.2)
ALBUMIN/GLOB SERPL: 1.5 G/DL
ALP SERPL-CCNC: 116 U/L (ref 39–117)
ALT SERPL W P-5'-P-CCNC: 20 U/L (ref 1–33)
ANION GAP SERPL CALCULATED.3IONS-SCNC: 10 MMOL/L (ref 5–15)
AST SERPL-CCNC: 23 U/L (ref 1–32)
BASOPHILS # BLD AUTO: 0.03 10*3/MM3 (ref 0–0.2)
BASOPHILS NFR BLD AUTO: 0.5 % (ref 0–1.5)
BILIRUB SERPL-MCNC: 0.4 MG/DL (ref 0–1.2)
BUN SERPL-MCNC: 16 MG/DL (ref 8–23)
BUN/CREAT SERPL: 19 (ref 7–25)
CALCIUM SPEC-SCNC: 9.7 MG/DL (ref 8.6–10.5)
CHLORIDE SERPL-SCNC: 106 MMOL/L (ref 98–107)
CHOLEST SERPL-MCNC: 237 MG/DL (ref 0–200)
CO2 SERPL-SCNC: 25 MMOL/L (ref 22–29)
CREAT SERPL-MCNC: 0.84 MG/DL (ref 0.57–1)
DEPRECATED RDW RBC AUTO: 43.1 FL (ref 37–54)
EGFRCR SERPLBLD CKD-EPI 2021: 76.8 ML/MIN/1.73
EOSINOPHIL # BLD AUTO: 0.12 10*3/MM3 (ref 0–0.4)
EOSINOPHIL NFR BLD AUTO: 2.2 % (ref 0.3–6.2)
ERYTHROCYTE [DISTWIDTH] IN BLOOD BY AUTOMATED COUNT: 12.7 % (ref 12.3–15.4)
GLOBULIN UR ELPH-MCNC: 2.7 GM/DL
GLUCOSE SERPL-MCNC: 90 MG/DL (ref 65–99)
HCT VFR BLD AUTO: 44.2 % (ref 34–46.6)
HDLC SERPL-MCNC: 62 MG/DL (ref 40–60)
HGB BLD-MCNC: 14.9 G/DL (ref 12–15.9)
IMM GRANULOCYTES # BLD AUTO: 0.01 10*3/MM3 (ref 0–0.05)
IMM GRANULOCYTES NFR BLD AUTO: 0.2 % (ref 0–0.5)
LDLC SERPL CALC-MCNC: 163 MG/DL (ref 0–100)
LDLC/HDLC SERPL: 2.59 {RATIO}
LYMPHOCYTES # BLD AUTO: 1.7 10*3/MM3 (ref 0.7–3.1)
LYMPHOCYTES NFR BLD AUTO: 30.9 % (ref 19.6–45.3)
MCH RBC QN AUTO: 31.2 PG (ref 26.6–33)
MCHC RBC AUTO-ENTMCNC: 33.7 G/DL (ref 31.5–35.7)
MCV RBC AUTO: 92.7 FL (ref 79–97)
MONOCYTES # BLD AUTO: 0.31 10*3/MM3 (ref 0.1–0.9)
MONOCYTES NFR BLD AUTO: 5.6 % (ref 5–12)
NEUTROPHILS NFR BLD AUTO: 3.33 10*3/MM3 (ref 1.7–7)
NEUTROPHILS NFR BLD AUTO: 60.6 % (ref 42.7–76)
NRBC BLD AUTO-RTO: 0 /100 WBC (ref 0–0.2)
PLATELET # BLD AUTO: 176 10*3/MM3 (ref 140–450)
PMV BLD AUTO: 10.5 FL (ref 6–12)
POTASSIUM SERPL-SCNC: 4.5 MMOL/L (ref 3.5–5.2)
PROT SERPL-MCNC: 6.8 G/DL (ref 6–8.5)
RBC # BLD AUTO: 4.77 10*6/MM3 (ref 3.77–5.28)
SODIUM SERPL-SCNC: 141 MMOL/L (ref 136–145)
T4 FREE SERPL-MCNC: 1.18 NG/DL (ref 0.92–1.68)
TRIGL SERPL-MCNC: 72 MG/DL (ref 0–150)
TSH SERPL DL<=0.05 MIU/L-ACNC: 2.24 UIU/ML (ref 0.27–4.2)
VLDLC SERPL-MCNC: 12 MG/DL (ref 5–40)
WBC NRBC COR # BLD AUTO: 5.5 10*3/MM3 (ref 3.4–10.8)

## 2025-05-29 PROCEDURE — 80050 GENERAL HEALTH PANEL: CPT | Performed by: STUDENT IN AN ORGANIZED HEALTH CARE EDUCATION/TRAINING PROGRAM

## 2025-05-29 PROCEDURE — 80061 LIPID PANEL: CPT | Performed by: STUDENT IN AN ORGANIZED HEALTH CARE EDUCATION/TRAINING PROGRAM

## 2025-05-29 PROCEDURE — 36415 COLL VENOUS BLD VENIPUNCTURE: CPT | Performed by: STUDENT IN AN ORGANIZED HEALTH CARE EDUCATION/TRAINING PROGRAM

## 2025-05-29 PROCEDURE — 84439 ASSAY OF FREE THYROXINE: CPT | Performed by: STUDENT IN AN ORGANIZED HEALTH CARE EDUCATION/TRAINING PROGRAM

## 2025-05-29 NOTE — PROGRESS NOTES
Venipuncture Blood Specimen Collection  Venipuncture performed in [facility] by Marcy Vanessa RN with good hemostasis. Patient tolerated the procedure well without complications.   05/29/25   Marcy Vanessa RN

## 2025-07-10 DIAGNOSIS — Z76.0 MEDICATION REFILL: ICD-10-CM

## 2025-07-10 RX ORDER — OMEPRAZOLE 20 MG/1
20 CAPSULE, DELAYED RELEASE ORAL
Qty: 90 CAPSULE | Refills: 1 | Status: SHIPPED | OUTPATIENT
Start: 2025-07-10

## (undated) DEVICE — SOL IRRG H2O PL/BG 1000ML STRL

## (undated) DEVICE — THE SINGLE USE ETRAP – POLYP TRAP IS USED FOR SUCTION RETRIEVAL OF ENDOSCOPICALLY REMOVED POLYPS.: Brand: ETRAP

## (undated) DEVICE — SNAR POLYP CAPTIFLEX XS/OVL 11X2.4MM 240CM 1P/U

## (undated) DEVICE — Device: Brand: DEFENDO AIR/WATER/SUCTION AND BIOPSY VALVE

## (undated) DEVICE — COLON KIT: Brand: MEDLINE INDUSTRIES, INC.